# Patient Record
Sex: FEMALE | Race: WHITE | NOT HISPANIC OR LATINO | Employment: OTHER | URBAN - NONMETROPOLITAN AREA
[De-identification: names, ages, dates, MRNs, and addresses within clinical notes are randomized per-mention and may not be internally consistent; named-entity substitution may affect disease eponyms.]

---

## 2020-11-24 ENCOUNTER — CONSULT (OUTPATIENT)
Dept: CARDIOLOGY CLINIC | Facility: CLINIC | Age: 65
End: 2020-11-24
Payer: COMMERCIAL

## 2020-11-24 VITALS
BODY MASS INDEX: 33.57 KG/M2 | SYSTOLIC BLOOD PRESSURE: 158 MMHG | OXYGEN SATURATION: 97 % | DIASTOLIC BLOOD PRESSURE: 68 MMHG | TEMPERATURE: 96.7 F | HEIGHT: 60 IN | HEART RATE: 71 BPM | WEIGHT: 171 LBS

## 2020-11-24 DIAGNOSIS — I25.10 CORONARY ARTERY CALCIFICATION SEEN ON CT SCAN: ICD-10-CM

## 2020-11-24 DIAGNOSIS — Q24.8 LEFT VENTRICULAR OUTFLOW OBSTRUCTION: ICD-10-CM

## 2020-11-24 DIAGNOSIS — E78.2 MIXED HYPERLIPIDEMIA: ICD-10-CM

## 2020-11-24 DIAGNOSIS — R94.39 ABNORMAL STRESS ECHOCARDIOGRAM: Primary | ICD-10-CM

## 2020-11-24 DIAGNOSIS — I10 ESSENTIAL HYPERTENSION: ICD-10-CM

## 2020-11-24 DIAGNOSIS — I51.7 MODERATE CONCENTRIC LEFT VENTRICULAR HYPERTROPHY: ICD-10-CM

## 2020-11-24 PROCEDURE — 99245 OFF/OP CONSLTJ NEW/EST HI 55: CPT | Performed by: INTERNAL MEDICINE

## 2020-11-24 RX ORDER — LOSARTAN POTASSIUM 100 MG/1
100 TABLET ORAL DAILY
COMMUNITY
Start: 2020-11-19 | End: 2021-10-21 | Stop reason: SDUPTHER

## 2020-11-24 RX ORDER — LEVOTHYROXINE SODIUM 0.07 MG/1
TABLET ORAL
COMMUNITY
Start: 2020-07-06

## 2020-11-24 RX ORDER — AMLODIPINE BESYLATE 5 MG/1
TABLET ORAL
COMMUNITY
Start: 2020-09-09 | End: 2020-11-24 | Stop reason: DRUGHIGH

## 2020-11-24 RX ORDER — INSULIN GLARGINE 100 [IU]/ML
INJECTION, SOLUTION SUBCUTANEOUS
COMMUNITY
End: 2021-10-21 | Stop reason: SDUPTHER

## 2020-11-24 RX ORDER — INSULIN ASPART 100 [IU]/ML
INJECTION, SOLUTION INTRAVENOUS; SUBCUTANEOUS
COMMUNITY
Start: 2020-08-27

## 2020-11-24 RX ORDER — OMEPRAZOLE 40 MG/1
40 CAPSULE, DELAYED RELEASE ORAL DAILY
COMMUNITY
End: 2021-10-21 | Stop reason: ALTCHOICE

## 2020-11-24 RX ORDER — SIMVASTATIN 40 MG
40 TABLET ORAL
COMMUNITY
End: 2021-04-20 | Stop reason: ALTCHOICE

## 2020-11-24 RX ORDER — AMLODIPINE BESYLATE 5 MG/1
5 TABLET ORAL DAILY
Qty: 30 TABLET | Refills: 11 | Status: SHIPPED | OUTPATIENT
Start: 2020-11-24 | End: 2021-10-21 | Stop reason: SDUPTHER

## 2020-11-24 RX ORDER — FLUTICASONE PROPIONATE 50 MCG
1 SPRAY, SUSPENSION (ML) NASAL DAILY
COMMUNITY

## 2020-11-29 PROBLEM — R94.39 ABNORMAL STRESS ECHOCARDIOGRAM: Status: ACTIVE | Noted: 2020-11-29

## 2020-11-29 PROBLEM — I25.10 CORONARY ARTERY CALCIFICATION SEEN ON CT SCAN: Status: ACTIVE | Noted: 2020-11-29

## 2020-12-01 ENCOUNTER — HOSPITAL ENCOUNTER (OUTPATIENT)
Dept: NON INVASIVE DIAGNOSTICS | Facility: HOSPITAL | Age: 65
Discharge: HOME | End: 2020-12-01
Attending: INTERNAL MEDICINE
Payer: COMMERCIAL

## 2020-12-01 DIAGNOSIS — Q24.8 LEFT VENTRICULAR OUTFLOW OBSTRUCTION: ICD-10-CM

## 2020-12-01 DIAGNOSIS — I10 ESSENTIAL HYPERTENSION: ICD-10-CM

## 2020-12-01 DIAGNOSIS — I51.7 MODERATE CONCENTRIC LEFT VENTRICULAR HYPERTROPHY: ICD-10-CM

## 2020-12-01 PROCEDURE — 93306 TTE W/DOPPLER COMPLETE: CPT

## 2020-12-01 PROCEDURE — 93306 TTE W/DOPPLER COMPLETE: CPT | Performed by: INTERNAL MEDICINE

## 2020-12-04 ENCOUNTER — TELEPHONE (OUTPATIENT)
Dept: CARDIOLOGY CLINIC | Facility: CLINIC | Age: 65
End: 2020-12-04

## 2020-12-10 ENCOUNTER — TELEPHONE (OUTPATIENT)
Dept: OTHER | Facility: OTHER | Age: 65
End: 2020-12-10

## 2020-12-11 ENCOUNTER — CLINICAL SUPPORT (OUTPATIENT)
Dept: CARDIOLOGY CLINIC | Facility: CLINIC | Age: 65
End: 2020-12-11
Payer: COMMERCIAL

## 2020-12-11 VITALS
OXYGEN SATURATION: 99 % | HEART RATE: 74 BPM | HEIGHT: 60 IN | DIASTOLIC BLOOD PRESSURE: 74 MMHG | TEMPERATURE: 97.3 F | BODY MASS INDEX: 33.96 KG/M2 | SYSTOLIC BLOOD PRESSURE: 144 MMHG | WEIGHT: 173 LBS

## 2020-12-11 DIAGNOSIS — I10 HYPERTENSION, UNSPECIFIED TYPE: Primary | ICD-10-CM

## 2020-12-11 PROCEDURE — 99211 OFF/OP EST MAY X REQ PHY/QHP: CPT

## 2021-01-13 ENCOUNTER — OFFICE VISIT (OUTPATIENT)
Dept: CARDIOLOGY CLINIC | Facility: CLINIC | Age: 66
End: 2021-01-13
Payer: COMMERCIAL

## 2021-01-13 VITALS
WEIGHT: 173 LBS | OXYGEN SATURATION: 97 % | BODY MASS INDEX: 33.96 KG/M2 | HEIGHT: 60 IN | SYSTOLIC BLOOD PRESSURE: 110 MMHG | DIASTOLIC BLOOD PRESSURE: 64 MMHG | HEART RATE: 67 BPM

## 2021-01-13 DIAGNOSIS — R94.39 ABNORMAL STRESS ECHOCARDIOGRAM: Primary | ICD-10-CM

## 2021-01-13 DIAGNOSIS — E78.2 MIXED HYPERLIPIDEMIA: ICD-10-CM

## 2021-01-13 DIAGNOSIS — E11.9 TYPE 2 DIABETES MELLITUS WITHOUT COMPLICATION, WITH LONG-TERM CURRENT USE OF INSULIN (HCC): ICD-10-CM

## 2021-01-13 DIAGNOSIS — I25.10 CORONARY ARTERY CALCIFICATION SEEN ON CT SCAN: ICD-10-CM

## 2021-01-13 DIAGNOSIS — I10 ESSENTIAL HYPERTENSION: ICD-10-CM

## 2021-01-13 DIAGNOSIS — Z79.4 TYPE 2 DIABETES MELLITUS WITHOUT COMPLICATION, WITH LONG-TERM CURRENT USE OF INSULIN (HCC): ICD-10-CM

## 2021-01-13 DIAGNOSIS — I51.7 MODERATE CONCENTRIC LEFT VENTRICULAR HYPERTROPHY: ICD-10-CM

## 2021-01-13 DIAGNOSIS — Q24.8 LEFT VENTRICULAR OUTFLOW OBSTRUCTION: ICD-10-CM

## 2021-01-13 PROCEDURE — 99214 OFFICE O/P EST MOD 30 MIN: CPT | Performed by: INTERNAL MEDICINE

## 2021-01-13 NOTE — PATIENT INSTRUCTIONS
Continue current medications  Call me with any change in symptoms  Will discuss repeat echocardiogram at follow up to reevaluate wall thickness of the heart

## 2021-01-13 NOTE — PROGRESS NOTES
Cardiology Office Visit    Damian Buchanan  [id number]  1955    [facility]  [address]  [facility] [id number] [phone]      Dear Madeline Pimentel DO,    I had the pleasure of seeing your patient at our [address] [facility] [place] office today 1/13/2021  As you know she is a pleasant female with a medical history as described below  Reason for office visit:  Follow up hypertension, hyperlipidemia, abnormal stress echocardiogram and abnormal CT findings  1  Abnormal stress echocardiogram  Assessment & Plan:  Patient initially presented to her PCP with complaints of right-sided chest discomfort  CT scan of the chest was ordered which showed severe coronary atherosclerotic disease  Patient was sent for a stress echocardiogram which was completed 09/22/2020 and showed no definitive evidence of ischemia at submaximal heart rate (84% MPHR vs 85% expected)  Stress echocardiogram was notable for moderate concentric left ventricular hypertrophy as well as mild mid cavitary obstruction with Valsalva  Patient did and continues to  note dyspnea on exertion (reports stable and actually improved since last visit) but denies any chest pain  Echocardiogram 12/1/2020 showed mild cLVH and a mid cavitary MG of 4 mmHg  Patients blood pressure was  not well controlled and likely LVH was due to this however may consider cardiac MRI to assess for hypertrophic cardiomyopathy pending review of repeat echocardiogram which we will plan for next year as BP is now well controlled  See further discussion below  2  Moderate concentric left ventricular hypertrophy  Assessment & Plan: Moderate concentric left ventricular hypertrophy noted on echocardiogram pre stress echocardiogram 09/22/2020  IVSd 1 4 cm  LVPWd 1 5 cm  Repeat echocardiogram on 12/1/2020 showed only mild cLVH with IVSd 1 1 and LVPWd 1 1    Blood pressure is well controlled on exam    Will plan repeat echocardiogram 12/2021 unless symptoms warrant earlier testing  3  Left ventricular outflow obstruction  Assessment & Plan:  Stress echocardiogram 09/22/2020 suggested mild mid cavitary obstruction with Valsalva  No definitive LVOT obstruction was noted  Please see full discussion under abnormal stress echocardiogram and moderate concentric left ventricular hypertrophy  Echocardiogram 12/2020 showed mild mid cavitary obstruction with a gradient of 4 mmHg  Will plan repeat echocardiogram 12/2021        4  Coronary artery calcification seen on CT scan  Assessment & Plan:  CT scan 09/04/2020 suggested severe coronary atherosclerotic disease  Non ischemic stress echocardiogram at 84% MPHR  I would recommend aggressive risk factor modification  LDL goal < 70    BP ideally < 130/80  Would have a low threshold for coronary CT angiogram vs cardiac catheterization if she has progressive shortness of breath/dyspnea on exertion or chest pain concerning for underlying coronary artery disease  Can consider coronary calcium scoring as well for further risk stratification  Would add aspirin however she has an aspirin allergy (itching)  5  Essential hypertension  Assessment & Plan:  Blood pressure is well controlled on exam today  Blood pressure was elevated on my personal recheck at 158/68 at last office visit  I had recommended increasing amlodipine to 5 mg daily at last office visit and blood pressures have been much better controlled  Continue losartan 100 mg daily  Ideally would like to see systolic blood pressure <647 given left ventricular hypertrophy on echocardiogram and mid cavitary obstruction  May benefit from change from amlodipine to verapamil in the future if mid cavitary gradients increase  6  Mixed hyperlipidemia  Assessment & Plan:  Lipid panel 12/24/2020: C 149  T 87  H 47  L 85  Lipid panel 9/3/2019: C 146  T 112  H 40  L 84     Goal LDL < 70 given findings suggestive of severe atherosclerotic disease on CT scan and DM II  Will  transition to rosuvastatin 40 mg daily if LDL remains > 70 at follow up  7  Type 2 diabetes mellitus without complication, with long-term current use of insulin ([facility] [address] )  Assessment & Plan:  HgA1c 7 6% on labs reviewed from 12/24/2020  Discussed the importance of glucose control from a cardiovascular standpoint  Needs close follow up with PCP  Goal LDL < 70  HPI     11/24/2020: Patient presents to the office for evaluation of abnormal stress echocardiogram and coronary artery disease  Patient underwent a CT scan for right-sided chest pain in her right rib area  CT scan dated 09/04/2020 showed severe coronary atherosclerotic disease  Patient subsequently underwent stress echocardiogram 09/22/2020 which did not show any inducible ischemia but was notable for a hypertensive blood pressure response to exercise  Of note the patient did not take her blood pressure medications the day of the test   Stress echocardiogram did suggest hyperdynamic LV systolic function with an EF greater than 70% as well as moderate concentric left ventricular hypertrophy and mild mid cavitary obstruction with Valsalva  Patient does tell me that she has had high blood pressure for at least 10 years  Amlodipine has been added over the last year or so  Blood pressures at home remain elevated in the 160s  She does note some dyspnea on exertion when she goes up and down approximately 3 flights of stairs  She denies any chest pain  She does have some intermittent right shoulder discomfort  She does describe occasionally hearing a hissing in her left ear  The patient does note trouble sleeping and decreased energy/fatigue  She also reports intermittent heartburn as well as alternating constipation/diarrhea  She does get up frequently at night to urinate  She also reports intermittent headaches as well as anxiety    She does have arthritis  She reports intermittent back pain  1/13/2021:  Patient presents to the office today for follow up  She denies any chest pain or shortness of breath  Dyspnea on exertion seems better from prior office visit  Echocardiogram 12/1/2020 reviewed  Labs reviewed  HgA1c 7 6%  LDL 85  She reports blood pressure is better since increase in amlodipine  She has been diagnosed with macular degeneration  She cannot take aspirin due to pruritis           Patient Active Problem List   Diagnosis    Hypertension    Mixed hyperlipidemia    Type 2 diabetes mellitus without complication, with long-term current use of insulin ([facility] )    Hypothyroid    Moderate concentric left ventricular hypertrophy    Left ventricular outflow obstruction    Coronary artery calcification seen on CT scan    Abnormal stress echocardiogram     Past Medical History:   Diagnosis Date    Arthritis     Basal cell carcinoma     Diabetes ([facility] )     Hypercholesteremia     Hypertension     Hypothyroid     Macular degeneration     Melanoma ([place] [address] )     Rosacea     Vertigo      Social History     Socioeconomic History    Marital status:      Spouse name: Not on file    Number of children: 2    Years of education: Not on file    Highest education level: Not on file   Occupational History    Not on file   Tobacco Use    Smoking status: Never Smoker    Smokeless tobacco: Never Used   Vaping Use    Vaping Use: Never used   Substance and Sexual Activity    Alcohol use: Never    Drug use: Never    Sexual activity: Not on file     Comment: Defer   Other Topics Concern    Not on file   Social History Narrative    Not on file     Social Determinants of Health     Financial Resource Strain: Not on file   Food Insecurity: Not on file   Transportation Needs: Not on file   Physical Activity: Not on file   Stress: Not on file   Social Connections: Not on file   Intimate Partner Violence: Not on file   Housing Stability: Not on file      Family History   Problem Relation Age of Onset    Diabetes Mother     Hyperlipidemia Mother     Hypertension Mother     Heart failure Mother     Heart attack Father         Unsure    Hypertension Father     Hypertension Brother     Diabetes Brother     Drug abuse Brother      Past Surgical History:   Procedure Laterality Date    CHOLECYSTECTOMY      HYSTERECTOMY      INCONTINENCE SURGERY      SKIN CANCER EXCISION      melanoma right arm  basal cell and squamous cell   TONSILLECTOMY       * Current medications reviewed  Allergies   Allergen Reactions    Lisinopril Cough    Naproxen Itching    Salicylates Itching         Cardiac Testing:     Echocardiogram 12/1/2020:   EF 70-75%  Mildly increased wall thickness  Dynamic obstruction during Valsalva in the mid cavity with a mean gradient of 4 mmHg  Mild diastolic dysfunction  Mildly dilated left atrium  Trace MR  Trace TR  Lipid panel 12/24/2020: C 149  T 87  H 47  L 85  Stress echocardiogram 09/22/2020:  Kvng Pion  6:00  6 METs  84% MPHR  PVC's at rest    Test terminated due to hypertensive BP response to exercise with peak /92  Echocardiogram notable for EF >70% (hyperdynamic) with moderately increased wall thickness and mild mid cavitary obstruction with Valsalva  IVSd 1 4 cm  LVPWd 1 5 cm  Mild left atrial enlargement was also noted  Mild diastolic dysfunction  Aortic sclerosis without stenosis  CT chest 09/04/2020:  No acute cardiopulmonary process  There is severe coronary atherosclerotic disease  No focal abnormality involving the chest wall was identified (preliminary report)  Lipid panel 9/3/2019: C 146  T 112  H 40  L 84  EKG 07/31/2019:  Normal sinus rhythm  Normal EKG  Leftward axis  Review of Systems:    Review of Systems   Constitutional: Positive for fatigue  Negative for activity change and appetite change     HENT: Negative for congestion, hearing loss, tinnitus and trouble swallowing  Eyes: Negative for visual disturbance  Respiratory: Positive for shortness of breath (dyspnea on exertion (this seems improved from prior visit))  Negative for cough, chest tightness and wheezing  Cardiovascular: Negative for chest pain, palpitations and leg swelling  Gastrointestinal: Positive for constipation and diarrhea  Negative for abdominal distention, abdominal pain, nausea and vomiting  Heartburn   Genitourinary: Negative for difficulty urinating  Nocturia   Musculoskeletal: Positive for arthralgias and back pain  Skin: Negative for rash  Neurological: Positive for headaches  Negative for dizziness, syncope and light-headedness  Hematological: Does not bruise/bleed easily  Psychiatric/Behavioral: Positive for sleep disturbance  Negative for confusion  The patient is nervous/anxious  All other systems reviewed and are negative  Vitals:    01/13/21 1542   BP: 110/64   BP Location: Left arm   Patient Position: Sitting   Pulse: 67   SpO2: 97%   Weight: 78 5 kg (173 lb)   Height: 5' (1 524 m)     Vitals:    01/13/21 1542   Weight: 78 5 kg (173 lb)     Height: 5' (152 4 cm)     Physical Exam   Constitutional: She is oriented to person, place, and time  She appears well-developed and well-nourished  HENT:   Head: Normocephalic and atraumatic  Eyes: Pupils are equal, round, and reactive to light  Conjunctivae are normal    Neck: Normal range of motion  No JVD present  Cardiovascular: Normal rate and regular rhythm  Exam reveals no gallop and no friction rub  Murmur heard  Pulmonary/Chest: Effort normal and breath sounds normal    Abdominal: Soft  Bowel sounds are normal    Musculoskeletal:         General: No edema  Neurological: She is alert and oriented to person, place, and time  Skin: Skin is warm and dry  Psychiatric: She has a normal mood and affect  Her behavior is normal    Vitals reviewed

## 2021-03-10 DIAGNOSIS — Z23 ENCOUNTER FOR IMMUNIZATION: ICD-10-CM

## 2021-04-20 ENCOUNTER — OFFICE VISIT (OUTPATIENT)
Dept: CARDIOLOGY CLINIC | Facility: CLINIC | Age: 66
End: 2021-04-20
Payer: COMMERCIAL

## 2021-04-20 VITALS
HEART RATE: 74 BPM | HEIGHT: 60 IN | SYSTOLIC BLOOD PRESSURE: 132 MMHG | DIASTOLIC BLOOD PRESSURE: 68 MMHG | BODY MASS INDEX: 35.14 KG/M2 | WEIGHT: 179 LBS | OXYGEN SATURATION: 95 %

## 2021-04-20 DIAGNOSIS — E78.2 MIXED HYPERLIPIDEMIA: ICD-10-CM

## 2021-04-20 DIAGNOSIS — R94.39 ABNORMAL STRESS ECHOCARDIOGRAM: Primary | ICD-10-CM

## 2021-04-20 DIAGNOSIS — Q24.8 LEFT VENTRICULAR OUTFLOW OBSTRUCTION: ICD-10-CM

## 2021-04-20 DIAGNOSIS — I25.10 CORONARY ARTERY CALCIFICATION SEEN ON CT SCAN: ICD-10-CM

## 2021-04-20 DIAGNOSIS — I51.7 MODERATE CONCENTRIC LEFT VENTRICULAR HYPERTROPHY: ICD-10-CM

## 2021-04-20 DIAGNOSIS — I10 ESSENTIAL HYPERTENSION: ICD-10-CM

## 2021-04-20 PROCEDURE — 99214 OFFICE O/P EST MOD 30 MIN: CPT | Performed by: INTERNAL MEDICINE

## 2021-04-20 RX ORDER — ROSUVASTATIN CALCIUM 20 MG/1
20 TABLET, COATED ORAL DAILY
Qty: 30 TABLET | Refills: 11 | Status: SHIPPED | OUTPATIENT
Start: 2021-04-20 | End: 2021-10-21 | Stop reason: SDUPTHER

## 2021-04-20 NOTE — PATIENT INSTRUCTIONS
I would continue to monitor blood pressure intermittently at home  Call me if you have any change in symptoms  Will plan repeat echocardiogram 12/2021  Can consider calcium scoring in the future  Can also consider cardiac MRI as discussed  I would recommend transition off of simvastatin and start rosuvastatin  This is a stronger statin  Ideally LDL goal should be less than 70  Repeat labs in 6-8 weeks

## 2021-10-21 ENCOUNTER — OFFICE VISIT (OUTPATIENT)
Dept: CARDIOLOGY CLINIC | Facility: CLINIC | Age: 66
End: 2021-10-21
Payer: MEDICARE

## 2021-10-21 VITALS
HEIGHT: 61 IN | WEIGHT: 177.4 LBS | HEART RATE: 70 BPM | BODY MASS INDEX: 33.49 KG/M2 | SYSTOLIC BLOOD PRESSURE: 128 MMHG | DIASTOLIC BLOOD PRESSURE: 62 MMHG | TEMPERATURE: 98.3 F

## 2021-10-21 DIAGNOSIS — Z79.4 TYPE 2 DIABETES MELLITUS WITH STABLE PROLIFERATIVE RETINOPATHY OF BOTH EYES, WITH LONG-TERM CURRENT USE OF INSULIN (HCC): ICD-10-CM

## 2021-10-21 DIAGNOSIS — E11.3553 TYPE 2 DIABETES MELLITUS WITH STABLE PROLIFERATIVE RETINOPATHY OF BOTH EYES, WITH LONG-TERM CURRENT USE OF INSULIN (HCC): ICD-10-CM

## 2021-10-21 DIAGNOSIS — I51.7 MODERATE CONCENTRIC LEFT VENTRICULAR HYPERTROPHY: ICD-10-CM

## 2021-10-21 DIAGNOSIS — I25.10 CORONARY ARTERY CALCIFICATION SEEN ON CT SCAN: ICD-10-CM

## 2021-10-21 DIAGNOSIS — E03.9 HYPOTHYROIDISM, UNSPECIFIED TYPE: ICD-10-CM

## 2021-10-21 DIAGNOSIS — R94.39 ABNORMAL STRESS ECHOCARDIOGRAM: ICD-10-CM

## 2021-10-21 DIAGNOSIS — Q24.8 LEFT VENTRICULAR OUTFLOW OBSTRUCTION: ICD-10-CM

## 2021-10-21 DIAGNOSIS — E78.2 MIXED HYPERLIPIDEMIA: ICD-10-CM

## 2021-10-21 DIAGNOSIS — I10 PRIMARY HYPERTENSION: Primary | ICD-10-CM

## 2021-10-21 PROCEDURE — 99214 OFFICE O/P EST MOD 30 MIN: CPT | Performed by: NURSE PRACTITIONER

## 2021-10-21 RX ORDER — FAMOTIDINE 20 MG/1
20 TABLET, FILM COATED ORAL 2 TIMES DAILY
COMMUNITY

## 2021-10-21 RX ORDER — PEN NEEDLE, DIABETIC 32GX 5/32"
NEEDLE, DISPOSABLE MISCELLANEOUS
COMMUNITY
Start: 2021-08-26

## 2021-10-21 RX ORDER — BLOOD SUGAR DIAGNOSTIC
STRIP MISCELLANEOUS
COMMUNITY
Start: 2021-06-29

## 2021-10-21 RX ORDER — INSULIN ASPART 100 [IU]/ML
INJECTION, SOLUTION INTRAVENOUS; SUBCUTANEOUS
COMMUNITY
Start: 2021-10-08 | End: 2021-10-21 | Stop reason: SDUPTHER

## 2021-10-21 RX ORDER — AMLODIPINE BESYLATE 5 MG/1
5 TABLET ORAL DAILY
Qty: 90 TABLET | Refills: 3 | Status: SHIPPED | OUTPATIENT
Start: 2021-10-21

## 2021-10-21 RX ORDER — INSULIN GLARGINE 100 [IU]/ML
40 INJECTION, SOLUTION SUBCUTANEOUS DAILY
COMMUNITY
Start: 2021-10-02

## 2021-10-21 RX ORDER — ROSUVASTATIN CALCIUM 20 MG/1
20 TABLET, COATED ORAL DAILY
Qty: 90 TABLET | Refills: 3 | Status: SHIPPED | OUTPATIENT
Start: 2021-10-21 | End: 2022-04-22

## 2021-10-21 RX ORDER — LOSARTAN POTASSIUM 100 MG/1
100 TABLET ORAL DAILY
Qty: 90 TABLET | Refills: 3 | Status: SHIPPED | OUTPATIENT
Start: 2021-10-21

## 2022-01-13 ENCOUNTER — HOSPITAL ENCOUNTER (OUTPATIENT)
Dept: NON INVASIVE DIAGNOSTICS | Facility: HOSPITAL | Age: 67
Discharge: HOME | End: 2022-01-13
Payer: MEDICARE

## 2022-01-13 ENCOUNTER — TELEPHONE (OUTPATIENT)
Dept: CARDIOLOGY CLINIC | Facility: CLINIC | Age: 67
End: 2022-01-13

## 2022-01-13 VITALS
HEIGHT: 61 IN | HEART RATE: 70 BPM | SYSTOLIC BLOOD PRESSURE: 128 MMHG | WEIGHT: 177 LBS | DIASTOLIC BLOOD PRESSURE: 62 MMHG | BODY MASS INDEX: 33.42 KG/M2

## 2022-01-13 DIAGNOSIS — I51.7 MODERATE CONCENTRIC LEFT VENTRICULAR HYPERTROPHY: ICD-10-CM

## 2022-01-13 DIAGNOSIS — I10 PRIMARY HYPERTENSION: ICD-10-CM

## 2022-01-13 DIAGNOSIS — Q24.8 LEFT VENTRICULAR OUTFLOW OBSTRUCTION: ICD-10-CM

## 2022-01-13 LAB
AORTIC VALVE ANNULUS: 3.1 CM
AORTIC VALVE MEAN VELOCITY: 14.4 M/S
APICAL FOUR CHAMBER EJECTION FRACTION: 85 %
AV AREA BY CONTINUOUS VTI: 1.7 CM2
AV AREA PEAK VELOCITY: 1.4 CM2
AV LVOT MEAN GRADIENT: 4 MMHG
AV LVOT PEAK GRADIENT: 6 MMHG
AV MEAN GRADIENT: 9 MMHG
AV PEAK GRADIENT: 16 MMHG
AV VELOCITY RATIO: 0.62
DOP CALC AO PEAK VEL: 2 M/S
DOP CALC AO VTI: 40.8 CM
DOP CALC LVOT DIAMETER: 1.7 CM
DOP CALC LVOT PEAK VEL VTI: 30.53 CM
DOP CALC LVOT PEAK VEL: 1.23 M/S
DOP CALC LVOT STROKE INDEX: 40.8 ML/M2
DOP CALC LVOT STROKE VOLUME: 73
E WAVE DECELERATION TIME: 232 MS
E/A RATIO: 0.57
FRACTIONAL SHORTENING: 51 (ref 28–44)
INTERVENTRICULAR SEPTUM IN DIASTOLE (PARASTERNAL SHORT AXIS VIEW): 1 CM
LAAS-AP4: 17.2 CM2
LEFT ATRIUM SIZE: 3.7 CM
LEFT INTERNAL DIMENSION IN SYSTOLE: 2.2 CM (ref 2.1–4)
LEFT VENTRICLE DIASTOLIC VOLUME (MOD BIPLANE): 43 ML
LEFT VENTRICLE SYSTOLIC VOLUME (MOD BIPLANE): 8 ML
LEFT VENTRICULAR INTERNAL DIMENSION IN DIASTOLE: 4.5 CM (ref 4.34–6.46)
LEFT VENTRICULAR POSTERIOR WALL IN END DIASTOLE: 0.9 CM
LEFT VENTRICULAR STROKE VOLUME: 77 ML
LV EF: 81 %
MV E'TISSUE VEL-LAT: 9 CM/S
MV E'TISSUE VEL-SEP: 6 CM/S
MV PEAK A VEL: 0.72 M/S
MV PEAK E VEL: 41 CM/S
RIGHT ATRIAL 2D VOLUME: 14 ML
RIGHT ATRIUM AREA SYSTOLE A4C: 8.3 CM2
SL CV PED ECHO LEFT VENTRICLE DIASTOLIC VOLUME (MOD BIPLANE) 2D: 93 ML
SL CV PED ECHO LEFT VENTRICLE SYSTOLIC VOLUME (MOD BIPLANE) 2D: 16 ML
TRICUSPID VALVE PEAK REGURGITATION VELOCITY: 2.22 M/S
TV PEAK GRADIENT: 20 MMHG
Z-SCORE OF LEFT VENTRICULAR DIMENSION IN END SYSTOLE: -1.62

## 2022-01-13 PROCEDURE — 93306 TTE W/DOPPLER COMPLETE: CPT

## 2022-01-13 NOTE — TELEPHONE ENCOUNTER
Spoke with pt regarding her updated echo results, which shows top-normal LV wall thickness with no evidence of a significant dynamic LV outflow tract gradient  She is feeling well at this time with no concerns  Will follow up in May as scheduled or sooner if any concerns    She verbalized understanding

## 2022-04-10 NOTE — ASSESSMENT & PLAN NOTE
Blood pressure is well controlled on exam today  Blood pressure was elevated on my personal recheck at 158/68 at last office visit  I had recommended increasing amlodipine to 5 mg daily at last office visit and blood pressures have been much better controlled  Continue losartan 100 mg daily  Ideally would like to see systolic blood pressure <436 given left ventricular hypertrophy on echocardiogram and mid cavitary obstruction  May benefit from change from amlodipine to verapamil in the future if mid cavitary gradients increase

## 2022-04-10 NOTE — ASSESSMENT & PLAN NOTE
Patient initially presented to her PCP with complaints of right-sided chest discomfort  CT scan of the chest was ordered which showed severe coronary atherosclerotic disease  Patient was sent for a stress echocardiogram which was completed 09/22/2020 and showed no definitive evidence of ischemia at submaximal heart rate (84% MPHR vs 85% expected)  Stress echocardiogram was notable for moderate concentric left ventricular hypertrophy as well as mild mid cavitary obstruction with Valsalva  Patient did and continues to  note dyspnea on exertion (reports stable and actually improved since last visit) but denies any chest pain  Echocardiogram 12/1/2020 showed mild cLVH and a mid cavitary MG of 4 mmHg  Patients blood pressure was  not well controlled and likely LVH was due to this however may consider cardiac MRI to assess for hypertrophic cardiomyopathy pending review of repeat echocardiogram which we will plan for next year as BP is now well controlled  See further discussion below

## 2022-04-10 NOTE — ASSESSMENT & PLAN NOTE
Lipid panel 12/24/2020: C 149  T 87  H 47  L 85  Lipid panel 9/3/2019: C 146  T 112  H 40  L 84  Goal LDL < 70 given findings suggestive of severe atherosclerotic disease on CT scan and DM II  Will  transition to rosuvastatin 40 mg daily if LDL remains > 70 at follow up

## 2022-04-10 NOTE — ASSESSMENT & PLAN NOTE
CT scan 09/04/2020 suggested severe coronary atherosclerotic disease  Non ischemic stress echocardiogram at 84% MPHR  I would recommend aggressive risk factor modification  LDL goal < 70    BP ideally < 130/80  Would have a low threshold for coronary CT angiogram vs cardiac catheterization if she has progressive shortness of breath/dyspnea on exertion or chest pain concerning for underlying coronary artery disease  Can consider coronary calcium scoring as well for further risk stratification  Would add aspirin however she has an aspirin allergy (itching)

## 2022-04-10 NOTE — ASSESSMENT & PLAN NOTE
Stress echocardiogram 09/22/2020 suggested mild mid cavitary obstruction with Valsalva  No definitive LVOT obstruction was noted  Please see full discussion under abnormal stress echocardiogram and moderate concentric left ventricular hypertrophy  Echocardiogram 12/2020 showed mild mid cavitary obstruction with a gradient of 4 mmHg  Will plan repeat echocardiogram 12/2021

## 2022-04-10 NOTE — ASSESSMENT & PLAN NOTE
Moderate concentric left ventricular hypertrophy noted on echocardiogram pre stress echocardiogram 09/22/2020  IVSd 1 4 cm  LVPWd 1 5 cm  Repeat echocardiogram on 12/1/2020 showed only mild cLVH with IVSd 1 1 and LVPWd 1 1  Blood pressure is well controlled on exam    Will plan repeat echocardiogram 12/2021 unless symptoms warrant earlier testing

## 2022-04-10 NOTE — ASSESSMENT & PLAN NOTE
HgA1c 7 6% on labs reviewed from 12/24/2020  Discussed the importance of glucose control from a cardiovascular standpoint  Needs close follow up with PCP  Goal LDL < 70

## 2022-04-20 NOTE — PROGRESS NOTES
Cardiology Office Visit    Renetta Acosta  [id number]  1955    [facility] [address]  [place] [address] [place] [address]  [phone]      Dear Laya Bell DO,    I had the pleasure of seeing your patient at our [address] [facility] office today 4/20/2021  As you know she is a pleasant female with a medical history as described below  Reason for office visit:  Follow up hypertension, hyperlipidemia, abnormal stress echocardiogram and abnormal CT findings  1  Abnormal stress echocardiogram    2  Moderate concentric left ventricular hypertrophy    3  Left ventricular outflow obstruction    4  Coronary artery calcification seen on CT scan    5  Essential hypertension    6  Mixed hyperlipidemia  -     Lipid panel; Future; Expected date: 06/20/2021         I would continue to monitor blood pressure intermittently at home  Call me if you have any change in symptoms  Will plan repeat echocardiogram 12/2021  Can consider calcium scoring in the future  Can also consider cardiac MRI as discussed  I would recommend transition off of simvastatin and start rosuvastatin  This is a stronger statin  Ideally LDL goal should be less than 70  Repeat labs in 6-8 weeks  HPI     11/24/2020: Patient presents to the office for evaluation of abnormal stress echocardiogram and coronary artery disease  Patient underwent a CT scan for right-sided chest pain in her right rib area  CT scan dated 09/04/2020 showed severe coronary atherosclerotic disease  Patient subsequently underwent stress echocardiogram 09/22/2020 which did not show any inducible ischemia but was notable for a hypertensive blood pressure response to exercise    Of note the patient did not take her blood pressure medications the day of the test   Stress echocardiogram did suggest hyperdynamic LV systolic function with an EF greater than 70% as well as moderate concentric left ventricular hypertrophy and mild mid cavitary obstruction with Valsalva  Patient does tell me that she has had high blood pressure for at least 10 years  Amlodipine has been added over the last year or so  Blood pressures at home remain elevated in the 160s  She does note some dyspnea on exertion when she goes up and down approximately 3 flights of stairs  She denies any chest pain  She does have some intermittent right shoulder discomfort  She does describe occasionally hearing a hissing in her left ear  The patient does note trouble sleeping and decreased energy/fatigue  She also reports intermittent heartburn as well as alternating constipation/diarrhea  She does get up frequently at night to urinate  She also reports intermittent headaches as well as anxiety  She does have arthritis  She reports intermittent back pain  1/13/2021:  Patient presents to the office today for follow up  She denies any chest pain or shortness of breath  Dyspnea on exertion seems better from prior office visit  Echocardiogram 12/1/2020 reviewed  Labs reviewed  HgA1c 7 6%  LDL 85  She reports blood pressure is better since increase in amlodipine  She has been diagnosed with macular degeneration  She cannot take aspirin due to pruritis  4/20/2021:  Patient presents to the office today for follow-up          Patient Active Problem List   Diagnosis    Hypertension    Mixed hyperlipidemia    Type 2 diabetes mellitus without complication, with long-term current use of insulin ([address] )    Hypothyroid    Moderate concentric left ventricular hypertrophy    Left ventricular outflow obstruction    Coronary artery calcification seen on CT scan    Abnormal stress echocardiogram     Past Medical History:   Diagnosis Date    Arthritis     Basal cell carcinoma     Diabetes ([address] )     Hypercholesteremia     Hypertension     Hypothyroid     Macular degeneration     Melanoma (HCC)     Rosacea     Vertigo Social History     Socioeconomic History    Marital status:      Spouse name: Not on file    Number of children: 2    Years of education: Not on file    Highest education level: Not on file   Occupational History    Not on file   Tobacco Use    Smoking status: Never Smoker    Smokeless tobacco: Never Used   Vaping Use    Vaping Use: Never used   Substance and Sexual Activity    Alcohol use: Never    Drug use: Never    Sexual activity: Not on file     Comment: Defer   Other Topics Concern    Not on file   Social History Narrative    Not on file     Social Determinants of Health     Financial Resource Strain: Not on file   Food Insecurity: Not on file   Transportation Needs: Not on file   Physical Activity: Not on file   Stress: Not on file   Social Connections: Not on file   Intimate Partner Violence: Not on file   Housing Stability: Not on file      Family History   Problem Relation Age of Onset    Diabetes Mother     Hyperlipidemia Mother     Hypertension Mother     Heart failure Mother     Heart attack Father         Unsure    Hypertension Father     Hypertension Brother     Diabetes Brother     Drug abuse Brother      Past Surgical History:   Procedure Laterality Date    CHOLECYSTECTOMY      HYSTERECTOMY      INCONTINENCE SURGERY      SKIN CANCER EXCISION      melanoma right arm  basal cell and squamous cell   TONSILLECTOMY       * Current medications reviewed  Allergies   Allergen Reactions    Lisinopril Cough    Naproxen Itching    Salicylates Itching         Cardiac Testing:     Echocardiogram 12/1/2020:   EF 70-75%  Mildly increased wall thickness  Dynamic obstruction during Valsalva in the mid cavity with a mean gradient of 4 mmHg  Mild diastolic dysfunction  Mildly dilated left atrium  Trace MR  Trace TR  Lipid panel 12/24/2020: C 149  T 87  H 47  L 85  Stress echocardiogram 09/22/2020:  Jennifer Abdalla  6:00  6 METs  84% MPHR   PVC's at rest    Test terminated due to hypertensive BP response to exercise with peak /92  Echocardiogram notable for EF >70% (hyperdynamic) with moderately increased wall thickness and mild mid cavitary obstruction with Valsalva  IVSd 1 4 cm  LVPWd 1 5 cm  Mild left atrial enlargement was also noted  Mild diastolic dysfunction  Aortic sclerosis without stenosis  CT chest 09/04/2020:  No acute cardiopulmonary process  There is severe coronary atherosclerotic disease  No focal abnormality involving the chest wall was identified (preliminary report)  Lipid panel 9/3/2019: C 146  T 112  H 40  L 84  EKG 07/31/2019:  Normal sinus rhythm  Normal EKG  Leftward axis  Review of Systems:    Review of Systems   Constitutional: Positive for fatigue  Negative for activity change and appetite change  HENT: Negative for congestion, hearing loss, tinnitus and trouble swallowing  Eyes: Negative for visual disturbance  Respiratory: Positive for shortness of breath (dyspnea on exertion (this seems improved from prior visit))  Negative for cough, chest tightness and wheezing  Cardiovascular: Negative for chest pain, palpitations and leg swelling  Gastrointestinal: Positive for constipation and diarrhea  Negative for abdominal distention, abdominal pain, nausea and vomiting  Heartburn   Genitourinary: Negative for difficulty urinating  Nocturia   Musculoskeletal: Positive for arthralgias and back pain  Skin: Negative for rash  Neurological: Positive for headaches  Negative for dizziness, syncope and light-headedness  Hematological: Does not bruise/bleed easily  Psychiatric/Behavioral: Positive for sleep disturbance  Negative for confusion  The patient is nervous/anxious  All other systems reviewed and are negative          Vitals:    04/20/21 1357 04/20/21 1440   BP: 142/64 132/68   Pulse: 74    SpO2: 95%    Weight: 81 2 kg (179 lb)    Height: 5' (1 524 m)      Vitals:    04/20/21 1357 Weight: 81 2 kg (179 lb)     Height: 5' (152 4 cm)     Physical Exam  Vitals reviewed  Constitutional:       Appearance: She is well-developed  HENT:      Head: Normocephalic and atraumatic  Eyes:      Conjunctiva/sclera: Conjunctivae normal       Pupils: Pupils are equal, round, and reactive to light  Neck:      Vascular: No JVD  Cardiovascular:      Rate and Rhythm: Normal rate and regular rhythm  Heart sounds: Murmur heard  No friction rub  No gallop  Pulmonary:      Effort: Pulmonary effort is normal       Breath sounds: Normal breath sounds  Abdominal:      General: Bowel sounds are normal       Palpations: Abdomen is soft  Musculoskeletal:      Cervical back: Normal range of motion  Skin:     General: Skin is warm and dry  Neurological:      Mental Status: She is alert and oriented to person, place, and time     Psychiatric:         Behavior: Behavior normal

## 2022-04-22 ENCOUNTER — TELEPHONE (OUTPATIENT)
Dept: CARDIOLOGY CLINIC | Facility: CLINIC | Age: 67
End: 2022-04-22

## 2022-04-22 NOTE — TELEPHONE ENCOUNTER
I got a refill request for rosuvastatin 20 mg daily  Keep please call patient and confirm that this is the correct dose as is documented as 40 mg in 1 of the notes but I do think it is actually 20 mg  Thank you

## 2022-06-16 ENCOUNTER — OFFICE VISIT (OUTPATIENT)
Dept: CARDIOLOGY CLINIC | Facility: CLINIC | Age: 67
End: 2022-06-16
Payer: MEDICARE

## 2022-06-16 VITALS
BODY MASS INDEX: 34.59 KG/M2 | SYSTOLIC BLOOD PRESSURE: 130 MMHG | HEIGHT: 61 IN | WEIGHT: 183.2 LBS | DIASTOLIC BLOOD PRESSURE: 60 MMHG | OXYGEN SATURATION: 96 % | HEART RATE: 69 BPM

## 2022-06-16 DIAGNOSIS — Q24.8 LEFT VENTRICULAR OUTFLOW OBSTRUCTION: ICD-10-CM

## 2022-06-16 DIAGNOSIS — E11.9 TYPE 2 DIABETES MELLITUS WITHOUT COMPLICATION, WITH LONG-TERM CURRENT USE OF INSULIN (HCC): ICD-10-CM

## 2022-06-16 DIAGNOSIS — I51.7 MODERATE CONCENTRIC LEFT VENTRICULAR HYPERTROPHY: ICD-10-CM

## 2022-06-16 DIAGNOSIS — Z79.4 TYPE 2 DIABETES MELLITUS WITHOUT COMPLICATION, WITH LONG-TERM CURRENT USE OF INSULIN (HCC): ICD-10-CM

## 2022-06-16 DIAGNOSIS — E78.2 MIXED HYPERLIPIDEMIA: ICD-10-CM

## 2022-06-16 DIAGNOSIS — R94.39 ABNORMAL STRESS ECHOCARDIOGRAM: Primary | ICD-10-CM

## 2022-06-16 DIAGNOSIS — I25.10 CORONARY ARTERY CALCIFICATION SEEN ON CT SCAN: ICD-10-CM

## 2022-06-16 DIAGNOSIS — I10 PRIMARY HYPERTENSION: ICD-10-CM

## 2022-06-16 PROCEDURE — 99214 OFFICE O/P EST MOD 30 MIN: CPT | Performed by: INTERNAL MEDICINE

## 2022-06-16 NOTE — PATIENT INSTRUCTIONS
Continue current medications without change  Cholesterol from 7/6/2021 looked great  Call me with any change in symptoms  Would plan repeat echocardiogram 1/2023

## 2022-06-16 NOTE — PROGRESS NOTES
Cardiology Office Visit    Lydia Sandhu  [id number]  1955    [facility] [address]  [facility] [address]  [phone]      Dear Tania Lazo DO,    I had the pleasure of seeing your patient at our [address] [facility] office today 6/16/2022  As you know she is a pleasant 75 y o  female with a medical history as described below  Reason for office visit:  Follow up hypertension, hyperlipidemia, abnormal stress echocardiogram and abnormal CT findings  1  Abnormal stress echocardiogram  Assessment & Plan:  Patient initially presented to her PCP with complaints of right-sided chest discomfort  CT scan of the chest was ordered which showed severe coronary atherosclerotic disease  Patient had stress echocardiogram 9/22/2020 and showed no definitive evidence of ischemia at submaximal heart rate (84% MPHR vs 85% expected)  Stress echocardiogram was notable for moderate concentric left ventricular hypertrophy as well as mild mid cavitary obstruction with Valsalva  Patient did and continues to  note dyspnea on exertion (reports stable and actually improved since last visit) but denies any chest pain  Echocardiogram 12/1/2020 showed mild cLVH and a mid cavitary MG of 4 mmHg  Patients blood pressure was not well controlled and likely LVH was due to this however may consider cardiac MRI to assess for hypertrophic cardiomyopathy  Echocardiogram 1/13/2022 showed no mid cavitary gradient or LVOT gradient  See further discussion below  2  Moderate concentric left ventricular hypertrophy  Assessment & Plan: Moderate concentric left ventricular hypertrophy noted on echocardiogram pre stress echocardiogram 09/22/2020  IVSd 1 4 cm  LVPWd 1 5 cm  Repeat echocardiogram on 12/1/2020 showed only mild cLVH with IVSd 1 1 and LVPWd 1 1    Blood pressure is well controlled on exam    Repeat echocardiogram 1/13/2022 showed top normal wall thickness  3  Left ventricular outflow obstruction  Assessment & Plan:  Stress echocardiogram 9/22/2020 suggested mild mid cavitary obstruction with Valsalva  No definitive LVOT obstruction was noted  Please see full discussion under abnormal stress echocardiogram and moderate concentric left ventricular hypertrophy  Similar mild mid cavitary obstruction with Valsalva noted on echo 12/1/2020  Repeat study 1/13/2022 showed no obstruction  Orders:  -     Echo complete w/ contrast if indicated; Future; Expected date: 01/16/2023    4  Coronary artery calcification seen on CT scan  Assessment & Plan:  CT scan 9/04/2020 suggested severe coronary atherosclerotic disease  Non ischemic stress echocardiogram at 84% MPHR  Recommend aggressive risk factor modification  LDL goal < 70    BP ideally < 130/80  Would have a low threshold for coronary CT angiogram vs cardiac catheterization if she has progressive shortness of breath/dyspnea on exertion or chest pain concerning for underlying coronary artery disease  Can consider coronary calcium scoring as well for further risk stratification  Would add aspirin however she has an aspirin allergy (itching)  5  Primary hypertension  Assessment & Plan:  Blood pressure is well controlled today  Continue amlodipine 5 mg daily and losartan 100 mg daily  Orders:  -     POCT ECG    6  Mixed hyperlipidemia  Assessment & Plan:  Lipid panel 7/6/2021:     HDL 40  LDL 67  She is tolerating rosuvastatin 20 mg daily  Goal LDL < 70       7  Type 2 diabetes mellitus without complication, with long-term current use of insulin ([address] )  Assessment & Plan:  Managed by PCP  HPI     11/24/2020: Patient presents to the office for evaluation of abnormal stress echocardiogram and coronary artery disease  Patient underwent a CT scan for right-sided chest pain in her right rib area    CT scan dated 9/04/2020 showed severe coronary atherosclerotic disease  Patient subsequently underwent stress echocardiogram 9/22/2020 which did not show any inducible ischemia but was notable for a hypertensive blood pressure response to exercise  Of note the patient did not take her blood pressure medications the day of the test   Stress echocardiogram did suggest hyperdynamic LV systolic function with an EF greater than 70% as well as moderate concentric left ventricular hypertrophy and mild mid cavitary obstruction with Valsalva  Patient does tell me that she has had high blood pressure for at least 10 years  Amlodipine has been added over the last year or so  Blood pressures at home remain elevated in the 160s  She does note some dyspnea on exertion when she goes up and down approximately 3 flights of stairs  She denies any chest pain  She does have some intermittent right shoulder discomfort  She does describe occasionally hearing a hissing in her left ear  The patient does note trouble sleeping and decreased energy/fatigue  She also reports intermittent heartburn as well as alternating constipation/diarrhea  She does get up frequently at night to urinate  She also reports intermittent headaches as well as anxiety  She does have arthritis  She reports intermittent back pain  1/13/2021:  Patient presents to the office today for follow up  She denies any chest pain or shortness of breath  Dyspnea on exertion seems better from prior office visit  Echocardiogram 12/1/2020 reviewed  Labs reviewed  HgA1c 7 6%  LDL 85  She reports blood pressure is better since increase in amlodipine  She has been diagnosed with macular degeneration  She cannot take aspirin due to pruritis  * Patient seen by Dominga Romero 10/21/2021 at which time she was doing well  6/16/2022: Patient presents to the office today for follow up  No chest pain  No shortness of breath  She does have some dyspnea on exertion with certain activities  She remains active   No palpitations  Intermittent sleep problems  Shoulder pain when she tries to lay on them  She does note some left arm pain which seems to occur at night  ECG today shows normal sinus rhythm at 65 bpm  Blood pressure has been well controlled         Patient Active Problem List   Diagnosis    Hypertension    Mixed hyperlipidemia    Type 2 diabetes mellitus without complication, with long-term current use of insulin ([place] [id number] )    Hypothyroid    Moderate concentric left ventricular hypertrophy    Left ventricular outflow obstruction    Coronary artery calcification seen on CT scan    Abnormal stress echocardiogram     Past Medical History:   Diagnosis Date    Arthritis     Basal cell carcinoma     Diabetes ([place] [address] )     Hypercholesteremia     Hypertension     Hypothyroid     Macular degeneration     Melanoma ([place] [address] )     Rosacea     Vertigo      Social History     Socioeconomic History    Marital status:      Spouse name: Not on file    Number of children: 2    Years of education: Not on file    Highest education level: Not on file   Occupational History    Not on file   Tobacco Use    Smoking status: Never Smoker    Smokeless tobacco: Never Used   Vaping Use    Vaping Use: Never used   Substance and Sexual Activity    Alcohol use: Never    Drug use: Never    Sexual activity: Not on file     Comment: Defer   Other Topics Concern    Not on file   Social History Narrative    Not on file     Social Determinants of Health     Financial Resource Strain: Not on file   Food Insecurity: Not on file   Transportation Needs: Not on file   Physical Activity: Not on file   Stress: Not on file   Social Connections: Not on file   Intimate Partner Violence: Not on file   Housing Stability: Not on file      Family History   Problem Relation Age of Onset    Diabetes Mother     Hyperlipidemia Mother     Hypertension Mother     Heart failure Mother     Heart attack Father         Suzy Dean Hypertension Father  Hypertension Brother     Diabetes Brother     Drug abuse Brother      Past Surgical History:   Procedure Laterality Date    CHOLECYSTECTOMY      HYSTERECTOMY      INCONTINENCE SURGERY      SKIN CANCER EXCISION      melanoma right arm  basal cell and squamous cell   TONSILLECTOMY         Current Outpatient Medications:     amLODIPine (NORVASC) 5 mg tablet, Take 1 tablet (5 mg total) by mouth daily, Disp: 90 tablet, Rfl: 3    BD Pen Needle Anna 2nd Gen 32G X 4 MM MISC, use with INSULIN PENS 5 PER DAY, Disp: , Rfl:     famotidine (PEPCID) 20 mg tablet, Take 20 mg by mouth 2 (two) times a day, Disp: , Rfl:     fluticasone (FLONASE) 50 mcg/act nasal spray, 1 spray into each nostril daily, Disp: , Rfl:     glucose blood (OneTouch Verio) test strip, Use up to 4 times a day  E11 9, Disp: , Rfl:     insulin aspart (NovoLOG FlexPen) 100 UNIT/ML injection pen, , Disp: , Rfl:     Lantus SoloStar 100 units/mL injection pen, Inject 40 Units under the skin daily In am, Disp: , Rfl:     levothyroxine 75 mcg tablet, Take by mouth, Disp: , Rfl:     losartan (COZAAR) 100 MG tablet, Take 1 tablet (100 mg total) by mouth daily, Disp: 90 tablet, Rfl: 3    Multiple Vitamins-Minerals (MULTIVITAMIN ADULT PO), Take by mouth, Disp: , Rfl:     Multiple Vitamins-Minerals (OCUVITE ADULT 50+ PO), Take by mouth For macular degeneration, Disp: , Rfl:     rosuvastatin (CRESTOR) 20 MG tablet, take 1 tablet by mouth once daily, Disp: 90 tablet, Rfl: 3       Allergies   Allergen Reactions    Lisinopril Cough    Naproxen Itching    Salicylates Itching         Cardiac Test  Results:     Echocardiogram 1/13/2022:   EF 75%  Minimal increase in velocity through the LVOT with Valsalva  No dynamic outflow tract gradient  Mild TR  Trace MR  Lipid panel 7/6/2021: C 135  T 164  H 40  L 67     Echocardiogram 12/1/2020:   EF 70-75%  Mildly increased wall thickness     Dynamic obstruction during Valsalva in the mid cavity with a mean gradient of 4 mmHg  Mild diastolic dysfunction  Mildly dilated left atrium  Trace MR  Trace TR  Lipid panel 12/24/2020: C 149  T 87  H 47  L 85  Stress echocardiogram 09/22/2020:  Bernie Lips  6:00  6 METs  84% MPHR  PVC's at rest    Test terminated due to hypertensive BP response to exercise with peak /92  Echocardiogram notable for EF >70% (hyperdynamic) with moderately increased wall thickness and mild mid cavitary obstruction with Valsalva  IVSd 1 4 cm  LVPWd 1 5 cm  Mild left atrial enlargement was also noted  Mild diastolic dysfunction  Aortic sclerosis without stenosis  CT chest 09/04/2020:  No acute cardiopulmonary process  There is severe coronary atherosclerotic disease  No focal abnormality involving the chest wall was identified (preliminary report)  Lipid panel 9/3/2019: C 146  T 112  H 40  L 84  EKG 07/31/2019:  Normal sinus rhythm  Normal EKG  Leftward axis  Review of Systems:    Review of Systems   Constitutional: Positive for fatigue  Negative for activity change and appetite change  HENT: Negative for congestion, hearing loss, tinnitus and trouble swallowing  Eyes: Negative for visual disturbance  Respiratory: Negative for cough, chest tightness, shortness of breath and wheezing  Dyspnea on exertion   Cardiovascular: Negative for chest pain, palpitations and leg swelling  Gastrointestinal: Negative for abdominal distention, abdominal pain, constipation, diarrhea, nausea and vomiting  Heartburn   Genitourinary: Negative for difficulty urinating  Nocturia   Musculoskeletal: Positive for arthralgias and back pain  Skin: Negative for rash  Neurological: Positive for headaches  Negative for dizziness, syncope and light-headedness  Hematological: Does not bruise/bleed easily  Psychiatric/Behavioral: Positive for sleep disturbance  Negative for confusion  The patient is nervous/anxious      All other systems reviewed and are negative  Vitals:    06/16/22 1243 06/16/22 1323   BP: 138/60 130/60   BP Location:  Left arm   Patient Position:  Sitting   Pulse: 69    SpO2: 96%    Weight: 83 1 kg (183 lb 3 2 oz)    Height: 5' 1" (1 549 m)      Vitals:    06/16/22 1243   Weight: 83 1 kg (183 lb 3 2 oz)     Height: 5' 1" (154 9 cm)     Physical Exam  Vitals reviewed  Constitutional:       Appearance: She is well-developed  HENT:      Head: Normocephalic and atraumatic  Eyes:      Conjunctiva/sclera: Conjunctivae normal       Pupils: Pupils are equal, round, and reactive to light  Neck:      Vascular: No JVD  Cardiovascular:      Rate and Rhythm: Normal rate and regular rhythm  Heart sounds: Murmur heard  No friction rub  No gallop  Pulmonary:      Effort: Pulmonary effort is normal       Breath sounds: Normal breath sounds  Abdominal:      General: Bowel sounds are normal       Palpations: Abdomen is soft  Musculoskeletal:      Cervical back: Normal range of motion  Skin:     General: Skin is warm and dry  Neurological:      Mental Status: She is alert and oriented to person, place, and time     Psychiatric:         Behavior: Behavior normal

## 2022-06-17 PROCEDURE — 93000 ELECTROCARDIOGRAM COMPLETE: CPT | Performed by: INTERNAL MEDICINE

## 2022-06-17 NOTE — ASSESSMENT & PLAN NOTE
CT scan 9/04/2020 suggested severe coronary atherosclerotic disease  Non ischemic stress echocardiogram at 84% MPHR  Recommend aggressive risk factor modification  LDL goal < 70    BP ideally < 130/80  Would have a low threshold for coronary CT angiogram vs cardiac catheterization if she has progressive shortness of breath/dyspnea on exertion or chest pain concerning for underlying coronary artery disease  Can consider coronary calcium scoring as well for further risk stratification  Would add aspirin however she has an aspirin allergy (itching)

## 2022-06-17 NOTE — ASSESSMENT & PLAN NOTE
Lipid panel 7/6/2021:     HDL 40  LDL 67  She is tolerating rosuvastatin 20 mg daily  Goal LDL < 70

## 2022-06-17 NOTE — ASSESSMENT & PLAN NOTE
Moderate concentric left ventricular hypertrophy noted on echocardiogram pre stress echocardiogram 09/22/2020  IVSd 1 4 cm  LVPWd 1 5 cm  Repeat echocardiogram on 12/1/2020 showed only mild cLVH with IVSd 1 1 and LVPWd 1 1  Blood pressure is well controlled on exam    Repeat echocardiogram 1/13/2022 showed top normal wall thickness

## 2022-06-17 NOTE — ASSESSMENT & PLAN NOTE
Patient initially presented to her PCP with complaints of right-sided chest discomfort  CT scan of the chest was ordered which showed severe coronary atherosclerotic disease  Patient had stress echocardiogram 9/22/2020 and showed no definitive evidence of ischemia at submaximal heart rate (84% MPHR vs 85% expected)  Stress echocardiogram was notable for moderate concentric left ventricular hypertrophy as well as mild mid cavitary obstruction with Valsalva  Patient did and continues to  note dyspnea on exertion (reports stable and actually improved since last visit) but denies any chest pain  Echocardiogram 12/1/2020 showed mild cLVH and a mid cavitary MG of 4 mmHg  Patients blood pressure was not well controlled and likely LVH was due to this however may consider cardiac MRI to assess for hypertrophic cardiomyopathy  Echocardiogram 1/13/2022 showed no mid cavitary gradient or LVOT gradient  See further discussion below

## 2022-06-17 NOTE — ASSESSMENT & PLAN NOTE
Stress echocardiogram 9/22/2020 suggested mild mid cavitary obstruction with Valsalva  No definitive LVOT obstruction was noted  Please see full discussion under abnormal stress echocardiogram and moderate concentric left ventricular hypertrophy  Similar mild mid cavitary obstruction with Valsalva noted on echo 12/1/2020  Repeat study 1/13/2022 showed no obstruction

## 2022-10-16 DIAGNOSIS — I51.7 MODERATE CONCENTRIC LEFT VENTRICULAR HYPERTROPHY: ICD-10-CM

## 2022-10-16 DIAGNOSIS — I10 PRIMARY HYPERTENSION: ICD-10-CM

## 2022-10-17 RX ORDER — AMLODIPINE BESYLATE 5 MG/1
TABLET ORAL
Qty: 90 TABLET | Refills: 3 | Status: SHIPPED | OUTPATIENT
Start: 2022-10-17

## 2023-01-13 DIAGNOSIS — I10 PRIMARY HYPERTENSION: ICD-10-CM

## 2023-01-13 RX ORDER — LOSARTAN POTASSIUM 100 MG/1
TABLET ORAL
Qty: 90 TABLET | Refills: 3 | Status: SHIPPED | OUTPATIENT
Start: 2023-01-13

## 2023-03-14 ENCOUNTER — HOSPITAL ENCOUNTER (OUTPATIENT)
Dept: NON INVASIVE DIAGNOSTICS | Facility: HOSPITAL | Age: 68
Discharge: HOME | End: 2023-03-14
Attending: INTERNAL MEDICINE

## 2023-03-14 VITALS
HEIGHT: 61 IN | WEIGHT: 183 LBS | HEART RATE: 70 BPM | DIASTOLIC BLOOD PRESSURE: 60 MMHG | BODY MASS INDEX: 34.55 KG/M2 | SYSTOLIC BLOOD PRESSURE: 130 MMHG

## 2023-03-14 DIAGNOSIS — Q24.8 LEFT VENTRICULAR OUTFLOW OBSTRUCTION: ICD-10-CM

## 2023-03-15 ENCOUNTER — TELEPHONE (OUTPATIENT)
Dept: CARDIOLOGY CLINIC | Facility: CLINIC | Age: 68
End: 2023-03-15

## 2023-03-15 LAB
AORTIC ROOT: 3 CM
APICAL FOUR CHAMBER EJECTION FRACTION: 79 %
E WAVE DECELERATION TIME: 244 MS
E/A RATIO: 0.74
FRACTIONAL SHORTENING: 54 (ref 28–44)
INTERVENTRICULAR SEPTUM IN DIASTOLE (PARASTERNAL SHORT AXIS VIEW): 1 CM
INTERVENTRICULAR SEPTUM: 1 CM (ref 0.6–1.1)
LAAS-AP4: 14.7 CM2
LEFT ATRIUM SIZE: 3.6 CM
LEFT INTERNAL DIMENSION IN SYSTOLE: 1.9 CM (ref 2.1–4)
LEFT VENTRICLE DIASTOLIC VOLUME (MOD BIPLANE): 51 ML
LEFT VENTRICLE SYSTOLIC VOLUME (MOD BIPLANE): 12 ML
LEFT VENTRICULAR INTERNAL DIMENSION IN DIASTOLE: 4.1 CM (ref 3.5–6)
LEFT VENTRICULAR POSTERIOR WALL IN END DIASTOLE: 1.1 CM
LEFT VENTRICULAR STROKE VOLUME: 63 ML
LV EF: 77 %
LVSV (TEICH): 63 ML
MV E'TISSUE VEL-LAT: 10 CM/S
MV E'TISSUE VEL-SEP: 7 CM/S
MV PEAK A VEL: 1.03 M/S
MV PEAK E VEL: 76 CM/S
MV STENOSIS PRESSURE HALF TIME: 71 MS
MV VALVE AREA P 1/2 METHOD: 3.1
RIGHT ATRIAL 2D VOLUME: 14 ML
RIGHT ATRIUM AREA SYSTOLE A4C: 8.7 CM2
RIGHT VENTRICLE ID DIMENSION: 2.9 CM
SL CV LV EF: 65
SL CV PED ECHO LEFT VENTRICLE DIASTOLIC VOLUME (MOD BIPLANE) 2D: 75 ML
SL CV PED ECHO LEFT VENTRICLE SYSTOLIC VOLUME (MOD BIPLANE) 2D: 12 ML
TRICUSPID ANNULAR PLANE SYSTOLIC EXCURSION: 1.9 CM

## 2023-03-15 NOTE — TELEPHONE ENCOUNTER
----- Message from Manasa Baron sent at 3/15/2023  1:53 PM EDT -----  Please let patient know that her echocardiogram shows normal heart function with no significant change from her prior echo  Will review in detail at follow up    Thank you!!

## 2023-05-05 ENCOUNTER — OFFICE VISIT (OUTPATIENT)
Dept: CARDIOLOGY CLINIC | Facility: CLINIC | Age: 68
End: 2023-05-05
Payer: MEDICARE

## 2023-05-05 VITALS
TEMPERATURE: 98.6 F | WEIGHT: 184 LBS | SYSTOLIC BLOOD PRESSURE: 150 MMHG | HEART RATE: 72 BPM | OXYGEN SATURATION: 95 % | DIASTOLIC BLOOD PRESSURE: 70 MMHG | HEIGHT: 61 IN | RESPIRATION RATE: 12 BRPM | BODY MASS INDEX: 34.74 KG/M2

## 2023-05-05 DIAGNOSIS — I51.7 MODERATE CONCENTRIC LEFT VENTRICULAR HYPERTROPHY: ICD-10-CM

## 2023-05-05 DIAGNOSIS — R94.39 ABNORMAL STRESS ECHOCARDIOGRAM: ICD-10-CM

## 2023-05-05 DIAGNOSIS — E03.9 HYPOTHYROIDISM, UNSPECIFIED TYPE: ICD-10-CM

## 2023-05-05 DIAGNOSIS — I10 PRIMARY HYPERTENSION: Primary | ICD-10-CM

## 2023-05-05 DIAGNOSIS — I25.10 CORONARY ARTERY CALCIFICATION SEEN ON CT SCAN: ICD-10-CM

## 2023-05-05 DIAGNOSIS — Q24.8 LEFT VENTRICULAR OUTFLOW OBSTRUCTION: ICD-10-CM

## 2023-05-05 DIAGNOSIS — E11.9 TYPE 2 DIABETES MELLITUS WITHOUT COMPLICATION, WITH LONG-TERM CURRENT USE OF INSULIN (HCC): ICD-10-CM

## 2023-05-05 DIAGNOSIS — Z79.4 TYPE 2 DIABETES MELLITUS WITHOUT COMPLICATION, WITH LONG-TERM CURRENT USE OF INSULIN (HCC): ICD-10-CM

## 2023-05-05 DIAGNOSIS — E78.2 MIXED HYPERLIPIDEMIA: ICD-10-CM

## 2023-05-05 PROCEDURE — 99214 OFFICE O/P EST MOD 30 MIN: CPT | Performed by: NURSE PRACTITIONER

## 2023-05-05 RX ORDER — ROSUVASTATIN CALCIUM 20 MG/1
20 TABLET, COATED ORAL DAILY
Qty: 90 TABLET | Refills: 3 | Status: SHIPPED | OUTPATIENT
Start: 2023-05-05

## 2023-05-05 NOTE — PATIENT INSTRUCTIONS
Continue current regimen. Please monitor BP periodically and report BP persistently > 140/90. Your echocardiogram shows normal heart function with normal wall thickness.

## 2023-08-06 NOTE — ASSESSMENT & PLAN NOTE
Moderate concentric left ventricular hypertrophy noted on echocardiogram pre stress echocardiogram 09/22/2020. IVSd 1.4 cm. LVPWd 1.5 cm. Repeat echocardiogram on 12/1/2020 showed only mild cLVH with IVSd 1.1 and LVPWd 1.1. Repeat echocardiogram 1/13/2022 showed top normal wall thickness. Repeat echo 3/14/2023 again shows top normal wall thickness. Will continue with BP optimization.

## 2023-08-06 NOTE — ASSESSMENT & PLAN NOTE
CT scan 9/04/2020 suggested “severe coronary atherosclerotic disease”. Non ischemic stress echocardiogram at 84% MPHR. Recommend aggressive risk factor modification. LDL goal < 70.   BP ideally < 130/80. Would have a low threshold for coronary CT angiogram vs cardiac catheterization if she has progressive shortness of breath/dyspnea on exertion or chest pain concerning for underlying coronary artery disease. Can consider coronary calcium scoring as well for further risk stratification. Would add aspirin however she has an aspirin allergy (itching).

## 2023-08-06 NOTE — ASSESSMENT & PLAN NOTE
Managed by PCP. Improved on 9/2022 labs. Reviewed importance of glucose control for cardiovascular protection.

## 2023-08-06 NOTE — ASSESSMENT & PLAN NOTE
Lipid panel 9/6/2022: C 141. T 150. H 41. L 70. She is tolerating rosuvastatin 20 mg daily  Goal LDL < 70.   Repeat lipid panel 9/2023

## 2023-08-06 NOTE — ASSESSMENT & PLAN NOTE
Patient initially presented to her PCP in 2020 with complaints of right-sided chest discomfort. CT scan of the chest was ordered which showed “severe coronary atherosclerotic disease”. Patient had stress echocardiogram 9/22/2020 and showed no definitive evidence of ischemia at submaximal heart rate (84% MPHR vs 85% expected). Stress echocardiogram was notable for moderate concentric left ventricular hypertrophy as well as mild mid cavitary obstruction with Valsalva. Echocardiogram 12/1/2020 showed mild cLVH and a mid cavitary MG of 4 mmHg. Patients blood pressure was not well controlled and likely LVH was due to this however may consider cardiac MRI to assess for hypertrophic cardiomyopathy. Echocardiogram 1/13/2022 showed no mid cavitary gradient or LVOT gradient. See further discussion below.

## 2023-08-06 NOTE — ASSESSMENT & PLAN NOTE
Stress echocardiogram 9/22/2020 suggested mild mid cavitary obstruction with Valsalva. No definitive LVOT obstruction was noted. Please see full discussion under abnormal stress echocardiogram and moderate concentric left ventricular hypertrophy. Similar mild mid cavitary obstruction with Valsalva noted on echo 12/1/2020. Repeat study 1/13/2022 showed no obstruction. Repeat echo 3/14/2023 again showed no evidence of LVOT obstruction. Currently without symptoms of concern. Will monitor.

## 2023-08-06 NOTE — PROGRESS NOTES
Cardiology Follow Up    Breann Bautista  1955  [id number]  [facility]  [address]  [address] [facility] [place]  [phone]    Mirtha Michaels presents today for routine follow up of HTN, HLD, and abnormal stress echo with LVH and left ventricular outflow obstruction. 1. Primary hypertension  Assessment & Plan:  Blood pressure is above goal today but she reports good control at home. Goal BP < 130/80. Continue amlodipine 5 mg daily and losartan 100 mg daily. She will monitor at home and report readings persistently above goal.  Encouraged 2 g sodium diet, weight control, regular exercise. 2. Abnormal stress echocardiogram  Assessment & Plan:  Patient initially presented to her PCP in 2020 with complaints of right-sided chest discomfort. CT scan of the chest was ordered which showed “severe coronary atherosclerotic disease”. Patient had stress echocardiogram 9/22/2020 and showed no definitive evidence of ischemia at submaximal heart rate (84% MPHR vs 85% expected). Stress echocardiogram was notable for moderate concentric left ventricular hypertrophy as well as mild mid cavitary obstruction with Valsalva. Echocardiogram 12/1/2020 showed mild cLVH and a mid cavitary MG of 4 mmHg. Patients blood pressure was not well controlled and likely LVH was due to this however may consider cardiac MRI to assess for hypertrophic cardiomyopathy. Echocardiogram 1/13/2022 showed no mid cavitary gradient or LVOT gradient. See further discussion below. 3. Left ventricular outflow obstruction  Assessment & Plan:  Stress echocardiogram 9/22/2020 suggested mild mid cavitary obstruction with Valsalva. No definitive LVOT obstruction was noted. Please see full discussion under abnormal stress echocardiogram and moderate concentric left ventricular hypertrophy. Similar mild mid cavitary obstruction with Valsalva noted on echo 12/1/2020.   Repeat study 1/13/2022 showed no obstruction. Repeat echo 3/14/2023 again showed no evidence of LVOT obstruction. Currently without symptoms of concern. Will monitor. 4. Moderate concentric left ventricular hypertrophy  Assessment & Plan: Moderate concentric left ventricular hypertrophy noted on echocardiogram pre stress echocardiogram 09/22/2020. IVSd 1.4 cm. LVPWd 1.5 cm. Repeat echocardiogram on 12/1/2020 showed only mild cLVH with IVSd 1.1 and LVPWd 1.1. Repeat echocardiogram 1/13/2022 showed top normal wall thickness. Repeat echo 3/14/2023 again shows top normal wall thickness. Will continue with BP optimization. 5. Mixed hyperlipidemia  Assessment & Plan:  Lipid panel 9/6/2022: C 141. T 150. H 41. L 70. She is tolerating rosuvastatin 20 mg daily  Goal LDL < 70. Repeat lipid panel 9/2023    Orders:  -     rosuvastatin (CRESTOR) 20 MG tablet; Take 1 tablet (20 mg total) by mouth daily    6. Coronary artery calcification seen on CT scan  Assessment & Plan:  CT scan 9/04/2020 suggested “severe coronary atherosclerotic disease”. Non ischemic stress echocardiogram at 84% MPHR. Recommend aggressive risk factor modification. LDL goal < 70.   BP ideally < 130/80. Would have a low threshold for coronary CT angiogram vs cardiac catheterization if she has progressive shortness of breath/dyspnea on exertion or chest pain concerning for underlying coronary artery disease. Can consider coronary calcium scoring as well for further risk stratification. Would add aspirin however she has an aspirin allergy (itching). 7. Type 2 diabetes mellitus without complication, with long-term current use of insulin ([address])  Assessment & Plan:  Managed by PCP. Improved on 9/2022 labs. Reviewed importance of glucose control for cardiovascular protection. 8. Hypothyroidism, unspecified type  Assessment & Plan:  Managed by PCP  On levothyroxine 75 mcg daily.   TSH at goal on 9/2022 labs       HPI  Tanja Rosario has a past medical history of HTN, HLD, T2DM, hypothyroid, melanoma, gallbladder disease, abnormal stress echo, LVH, and coronary artery calcification seen on CT.     She initially was referred to Dr. Jovon Devine for evaluation of abnormal stress echocardiogram. She was undergoing work up for right sided abdominal/chest pain. CT of her chest revealed "severe coronary atherosclerotic disease". A stress echo was ordered which showed no definitive ischemia at submaximal heart rate (85% MPHR vs 85% expected). She did have evidence of moderate LVH and mild mid cavitary obstruction with Valsalva. Echocardiogram 1/13/2022 showed EF 75% with no turbulence seen through the LVOT at rest and minimal increase in velocity with valsalva. No significant dynamic outflow tract gradient was identified. She followed up most recently with Dr. Jovon Devine on 6/16/2022 at which time she noted dyspnea with certain activities and left arm pain at night. No chest pain or shortness of breath. ECG showed NSR, rate 65 bpm. BP was well controlled. Echocardiogram 3/14/2023 shows EF 65% with wall thickness upper limits of normal and no turbulence seen through the LVOT at rest or with Valsalva.     5/5/2023: Marlee Benitez presents today for routine follow up. We reviewed the results of her echocardiogram. She states that she has been doing very well. No chest pain, shortness of breath, edema. She reports good BP control at home. She completed labs in 9/2022 and will be going for follow up labs in the fall with her PCP again.      Medical Problems     Problem List     Hypertension    Moderate concentric left ventricular hypertrophy    Left ventricular outflow obstruction    Abnormal stress echocardiogram    Coronary artery calcification seen on CT scan    Mixed hyperlipidemia    Type 2 diabetes mellitus without complication, with long-term current use of insulin ([address])    Hypothyroid        Past Medical History:   Diagnosis Date   • Arthritis    • Basal cell carcinoma    • Diabetes ([address] )    • Hypercholesteremia    • Hypertension    • Hypothyroid    • Macular degeneration    • Melanoma ([address] )    • Rosacea    • Vertigo      Social History     Socioeconomic History   • Marital status:      Spouse name: Not on file   • Number of children: 2   • Years of education: Not on file   • Highest education level: Not on file   Occupational History   • Not on file   Tobacco Use   • Smoking status: Never   • Smokeless tobacco: Never   Vaping Use   • Vaping Use: Never used   Substance and Sexual Activity   • Alcohol use: Never   • Drug use: Never   • Sexual activity: Not on file     Comment: Defer   Other Topics Concern   • Not on file   Social History Narrative   • Not on file     Social Determinants of Health     Financial Resource Strain: Not on file   Food Insecurity: Not on file   Transportation Needs: Not on file   Physical Activity: Not on file   Stress: Not on file   Social Connections: Not on file   Intimate Partner Violence: Not on file   Housing Stability: Not on file      Family History   Problem Relation Age of Onset   • Diabetes Mother    • Hyperlipidemia Mother    • Hypertension Mother    • Heart failure Mother    • Heart attack Father         Unsure   • Hypertension Father    • Hypertension Brother    • Diabetes Brother    • Drug abuse Brother      Past Surgical History:   Procedure Laterality Date   • CHOLECYSTECTOMY     • HYSTERECTOMY     • INCONTINENCE SURGERY     • MOHS SURGERY      on face, [facility]   • SKIN CANCER EXCISION      melanoma right arm. basal cell and squamous cell.    • TONSILLECTOMY         Current Outpatient Medications:   •  amLODIPine (NORVASC) 5 mg tablet, take 1 tablet by mouth once daily, Disp: 90 tablet, Rfl: 3  •  BD Pen Needle Anna 2nd Gen 32G X 4 MM MISC, use with INSULIN PENS 5 PER DAY, Disp: , Rfl:   •  fluticasone (FLONASE) 50 mcg/act nasal spray, 1 spray into each nostril daily, Disp: , Rfl:   •  glucose blood (OneTouch Verio) test strip, Use up to 4 times a day  E11.9, Disp: , Rfl:   •  insulin aspart (NovoLOG FlexPen) 100 UNIT/ML injection pen, Inject 30 Units under the skin 3 (three) times a day with meals, Disp: , Rfl:   •  Lantus SoloStar 100 units/mL injection pen, Inject 45 Units under the skin daily In am, Disp: , Rfl:   •  levothyroxine 75 mcg tablet, Take by mouth, Disp: , Rfl:   •  losartan (COZAAR) 100 MG tablet, take 1 tablet by mouth once daily, Disp: 90 tablet, Rfl: 3  •  Multiple Vitamins-Minerals (OCUVITE ADULT 50+ PO), Take by mouth For macular degeneration, Disp: , Rfl:   •  rosuvastatin (CRESTOR) 20 MG tablet, Take 1 tablet (20 mg total) by mouth daily, Disp: 90 tablet, Rfl: 3  Allergies   Allergen Reactions   • Lisinopril Cough   • Naproxen Itching   • Salicylates Itching     Cardiac testing:  Lipid panel 9/6/2022: C 141. T 150. H 41. L 70. Echocardiogram 3/14/2023:  EF 65%. Normal LV wall thickness with no turbulence seen through the LVOT at rest or with Valsalva. Grade 1 DD. LA mildly dilated. Trace TR. Trace MA. ECG 6/16/2022: Normal sinus rhythm. Normal ECG. Echocardiogram 1/13/2022:   EF 75%. Minimal increase in velocity through the LVOT with Valsalva. No dynamic outflow tract gradient. Mild TR. Trace MR.      Lipid panel 7/6/2021: C 135. T 164. H 40. L 67.     Echocardiogram 12/1/2020:   EF 70-75%. Mildly increased wall thickness. Dynamic obstruction during Valsalva in the mid cavity with a mean gradient of 4 mmHg. Mild diastolic dysfunction. Mildly dilated left atrium. Trace MR. Trace TR.      Lipid panel 12/24/2020: C 149. T 87. H 47. L 85.     Stress echocardiogram 09/22/2020:  Malathi David. 6:00. 6 METs. 84% MPHR. PVC's at rest.   Test terminated due to hypertensive BP response to exercise with peak /92. Echocardiogram notable for EF >70% (hyperdynamic) with moderately increased wall thickness and mild mid cavitary obstruction with Valsalva. IVSd 1.4 cm.  LVPWd 1. 5 cm. Mild left atrial enlargement was also noted. Mild diastolic dysfunction. Aortic sclerosis without stenosis.      CT chest 09/04/2020:  No acute cardiopulmonary process. There is severe coronary atherosclerotic disease. No focal abnormality involving the chest wall was identified (preliminary report).     Lipid panel 9/3/2019: C 146. T 112. H 40. L 84.      EKG 07/31/2019:  Normal sinus rhythm. Normal EKG. Leftward axis. Review of Systems   Constitutional: Negative. HENT: Negative. Cardiovascular: Negative for chest pain, dyspnea on exertion, irregular heartbeat, leg swelling, near-syncope, orthopnea and palpitations. Respiratory: Negative for cough and snoring. Endocrine: Negative. Skin: Negative. Musculoskeletal: Negative. Gastrointestinal: Negative. Genitourinary: Negative. Neurological: Negative. Psychiatric/Behavioral: Negative. Vitals:    05/05/23 1548   BP: 150/70   Pulse: 72   Resp: 12   Temp: 98.6 °F (37 °C)   SpO2: 95%     Vitals:    05/05/23 1548   Weight: 83.5 kg (184 lb)     Height: 5' 1" (154.9 cm)   Body mass index is 34.77 kg/m². Physical Exam  Vitals and nursing note reviewed. Constitutional:       General: She is not in acute distress. Appearance: She is well-developed. She is obese. She is not diaphoretic. HENT:      Head: Normocephalic and atraumatic. Neck:      Thyroid: No thyromegaly. Vascular: No carotid bruit or JVD. Cardiovascular:      Rate and Rhythm: Normal rate and regular rhythm. Pulses: Intact distal pulses. Radial pulses are 2+ on the right side and 2+ on the left side. Heart sounds: Normal heart sounds, S1 normal and S2 normal. No murmur heard. Comments: No edema  Pulmonary:      Effort: Pulmonary effort is normal.      Breath sounds: Normal breath sounds. Abdominal:      General: There is no distension. Palpations: Abdomen is soft. Tenderness:  There is no abdominal tenderness. Musculoskeletal:         General: Normal range of motion. Cervical back: Normal range of motion and neck supple. Lymphadenopathy:      Cervical: No cervical adenopathy. Skin:     General: Skin is warm and dry. Neurological:      Mental Status: She is alert and oriented to person, place, and time. Cranial Nerves: No cranial nerve deficit.    Psychiatric:         Behavior: Behavior normal.

## 2023-08-06 NOTE — ASSESSMENT & PLAN NOTE
Blood pressure is above goal today but she reports good control at home. Goal BP < 130/80. Continue amlodipine 5 mg daily and losartan 100 mg daily. She will monitor at home and report readings persistently above goal.  Encouraged 2 g sodium diet, weight control, regular exercise.

## 2023-10-11 DIAGNOSIS — I10 PRIMARY HYPERTENSION: ICD-10-CM

## 2023-10-11 DIAGNOSIS — I51.7 MODERATE CONCENTRIC LEFT VENTRICULAR HYPERTROPHY: ICD-10-CM

## 2023-10-11 RX ORDER — AMLODIPINE BESYLATE 5 MG/1
TABLET ORAL
Qty: 90 TABLET | Refills: 3 | Status: SHIPPED | OUTPATIENT
Start: 2023-10-11

## 2023-10-11 NOTE — TELEPHONE ENCOUNTER
I received refill request. Patient is scheduled for November with Dr. Elza Morales. Can you please reach out to get this rescheduled with me or a physician? Thank you! Difficulty concentrating/Difficulty making decisions/Difficulty remembering

## 2023-12-29 ENCOUNTER — OFFICE VISIT (OUTPATIENT)
Dept: CARDIOLOGY CLINIC | Facility: CLINIC | Age: 68
End: 2023-12-29
Payer: MEDICARE

## 2023-12-29 VITALS
OXYGEN SATURATION: 98 % | HEART RATE: 71 BPM | SYSTOLIC BLOOD PRESSURE: 128 MMHG | WEIGHT: 189 LBS | TEMPERATURE: 97.2 F | DIASTOLIC BLOOD PRESSURE: 70 MMHG | HEIGHT: 61 IN | BODY MASS INDEX: 35.68 KG/M2

## 2023-12-29 DIAGNOSIS — I51.7 MODERATE CONCENTRIC LEFT VENTRICULAR HYPERTROPHY: ICD-10-CM

## 2023-12-29 DIAGNOSIS — Q24.8 LEFT VENTRICULAR OUTFLOW OBSTRUCTION: ICD-10-CM

## 2023-12-29 DIAGNOSIS — E78.2 MIXED HYPERLIPIDEMIA: ICD-10-CM

## 2023-12-29 DIAGNOSIS — R94.39 ABNORMAL STRESS ECHOCARDIOGRAM: ICD-10-CM

## 2023-12-29 DIAGNOSIS — Z79.4 TYPE 2 DIABETES MELLITUS WITHOUT COMPLICATION, WITH LONG-TERM CURRENT USE OF INSULIN (HCC): ICD-10-CM

## 2023-12-29 DIAGNOSIS — I25.10 CORONARY ARTERY CALCIFICATION SEEN ON CT SCAN: ICD-10-CM

## 2023-12-29 DIAGNOSIS — E03.9 HYPOTHYROIDISM, UNSPECIFIED TYPE: ICD-10-CM

## 2023-12-29 DIAGNOSIS — E11.9 TYPE 2 DIABETES MELLITUS WITHOUT COMPLICATION, WITH LONG-TERM CURRENT USE OF INSULIN (HCC): ICD-10-CM

## 2023-12-29 DIAGNOSIS — I10 PRIMARY HYPERTENSION: Primary | ICD-10-CM

## 2023-12-29 PROCEDURE — 93000 ELECTROCARDIOGRAM COMPLETE: CPT | Performed by: NURSE PRACTITIONER

## 2023-12-29 PROCEDURE — 99214 OFFICE O/P EST MOD 30 MIN: CPT | Performed by: NURSE PRACTITIONER

## 2023-12-29 RX ORDER — LOSARTAN POTASSIUM 100 MG/1
100 TABLET ORAL DAILY
Qty: 90 TABLET | Refills: 3 | Status: SHIPPED | OUTPATIENT
Start: 2023-12-29

## 2023-12-29 NOTE — PROGRESS NOTES
Cardiology Follow Up    Carli Hernández  1955  [id number]  [facility] [place] [address]  [address]  [place] [address] [phone]    Carli presents today for routine follow up of HTN, HLD, and abnormal stress echo with LVH and left ventricular outflow obstruction.     1. Primary hypertension  Assessment & Plan:  Blood pressure is excellent on my check today.  Goal BP < 130/80.  Continue amlodipine 5 mg daily and losartan 100 mg daily.  Encouraged 2 g sodium diet, weight control, regular exercise.  Recent lab work reviewed.    Orders:  -     losartan (COZAAR) 100 MG tablet; Take 1 tablet (100 mg total) by mouth daily  -     POCT ECG    2. Moderate concentric left ventricular hypertrophy  Assessment & Plan:  Moderate concentric left ventricular hypertrophy noted on echocardiogram pre stress echocardiogram 09/22/2020.  IVSd 1.4 cm. LVPWd 1.5 cm.   Repeat echocardiogram on 12/1/2020 showed only mild cLVH with IVSd 1.1 and LVPWd 1.1.  Repeat echocardiogram 1/13/2022 showed top normal wall thickness.   Repeat echo 3/14/2023 again shows top normal wall thickness.  BP has been excellent on recent readings.  Plan updated echo in spring 2025 or sooner if symptoms warrant.      3. Left ventricular outflow obstruction  Assessment & Plan:  Stress echocardiogram 9/22/2020 suggested mild mid cavitary obstruction with Valsalva.  No definitive LVOT obstruction was noted.  Please see full discussion under abnormal stress echocardiogram and moderate concentric left ventricular hypertrophy.  Similar mild mid cavitary obstruction with Valsalva noted on echo 12/1/2020.  Repeat study 1/13/2022 showed no obstruction.   Repeat echo 3/14/2023 again showed no evidence of LVOT obstruction.  Currently without symptoms of concern.  Will monitor.      4. Abnormal stress echocardiogram  Assessment & Plan:  Patient initially presented to her PCP in 2020 with complaints of  right-sided chest discomfort.  CT scan of the chest was ordered which showed “severe coronary atherosclerotic disease”.  Patient had stress echocardiogram 9/22/2020 and showed no definitive evidence of ischemia at submaximal heart rate (84% MPHR vs 85% expected).  Stress echocardiogram was notable for moderate concentric left ventricular hypertrophy as well as mild mid cavitary obstruction with Valsalva.  Echocardiogram 12/1/2020 showed mild cLVH and a mid cavitary MG of 4 mmHg.   Patients blood pressure was not well controlled and likely LVH was due to this however may consider cardiac MRI to assess for hypertrophic cardiomyopathy.   Echocardiogram 1/13/2022 showed no mid cavitary gradient or LVOT gradient.   See further discussion below.       5. Coronary artery calcification seen on CT scan  Assessment & Plan:  CT scan 9/04/2020 suggested “severe coronary atherosclerotic disease”.  Non ischemic stress echocardiogram at 84% MPHR.    Recommend aggressive risk factor modification.   LDL goal < 70.   BP ideally < 130/80.  Would have a low threshold for coronary CT angiogram vs cardiac catheterization if she has progressive shortness of breath/dyspnea on exertion or chest pain concerning for underlying coronary artery disease.  Would add aspirin however she has an aspirin allergy (itching).   At this time she adamantly denies anginal complaints.      6. Mixed hyperlipidemia  Assessment & Plan:  Lipid panel 9/6/2022: C 141. T 150. H 41. L 70.  Lipid panel 8/9/2023: C 145. T 114. H 42. L 80.  She is tolerating rosuvastatin 20 mg daily  Goal LDL < 70.  Plan repeat lipid panel in 6 months and will up-titrate rosuvastatin if LDL remains above goal.    Orders:  -     Lipid Panel with Direct LDL reflex; Future    7. Type 2 diabetes mellitus without complication, with long-term current use of insulin (HCC)  Assessment & Plan:  Managed by PCP.   7.4 on recent labs.  Reviewed importance of glucose control for cardiovascular  "protection.      8. Hypothyroidism, unspecified type  Assessment & Plan:  Managed by PCP  On levothyroxine 75 mcg daily.  TSH at goal on 9/2022 labs         HPI  Margaret has a past medical history of HTN, HLD, T2DM, hypothyroid, melanoma, gallbladder disease, abnormal stress echo, LVH, and coronary artery calcification seen on CT.     She initially was referred to Dr. Cobos for evaluation of abnormal stress echocardiogram. She was undergoing work up for right sided abdominal/chest pain. CT of her chest revealed \"severe coronary atherosclerotic disease\". A stress echo was ordered which showed no definitive ischemia at submaximal heart rate (85% MPHR vs 85% expected). She did have evidence of moderate LVH and mild mid cavitary obstruction with Valsalva.      Echocardiogram 1/13/2022 showed EF 75% with no turbulence seen through the LVOT at rest and minimal increase in velocity with valsalva. No significant dynamic outflow tract gradient was identified.     She followed up most recently with Dr. Cobos on 6/16/2022 at which time she noted dyspnea with certain activities and left arm pain at night. No chest pain or shortness of breath. ECG showed NSR, rate 65 bpm. BP was well controlled.     Echocardiogram 3/14/2023 shows EF 65% with wall thickness upper limits of normal and no turbulence seen through the LVOT at rest or with Valsalva.     She followed up most recently on 5/5/2023 at which time we reviewed the results of her echo. She was doing very well with good control of her blood pressure at home.     12/29/2023: Margaret presents for routine follow up. She had a busy holiday as her daughter was  on 12/23/23. She states she was able to keep up with the activity. She denies unusual fatigue. No chest discomfort or shortness of breath. No edema, palpitations, or lightheadedness. She reports stable dyspnea on exertion when carrying heavy items up the steps. She completed labs in August for her PCP and results are " reviewed with her today. Ha1c was 7.4. LDL 80. She has been monitoring her BP at home with readings in the 120/70's consistently.    Medical Problems       Problem List       Hypertension    Moderate concentric left ventricular hypertrophy    Left ventricular outflow obstruction    Abnormal stress echocardiogram    Coronary artery calcification seen on CT scan    Mixed hyperlipidemia    Type 2 diabetes mellitus without complication, with long-term current use of insulin (HCC)    Hypothyroid        Past Medical History:   Diagnosis Date    Arthritis     Basal cell carcinoma     Diabetes (HCC)     Hypercholesteremia     Hypertension     Hypothyroid     Macular degeneration     Melanoma (HCC)     Rosacea     Vertigo      Social History     Socioeconomic History    Marital status:      Spouse name: Not on file    Number of children: 2    Years of education: Not on file    Highest education level: Not on file   Occupational History    Not on file   Tobacco Use    Smoking status: Never    Smokeless tobacco: Never   Vaping Use    Vaping status: Never Used   Substance and Sexual Activity    Alcohol use: Never    Drug use: Never    Sexual activity: Not on file     Comment: Defer   Other Topics Concern    Not on file   Social History Narrative    Not on file     Social Determinants of Health     Financial Resource Strain: Not on file   Food Insecurity: No Food Insecurity (2/21/2023)    Received from [facility]    Hunger Vital Sign     Worried About Running Out of Food in the Last Year: Never true     Ran Out of Food in the Last Year: Never true   Transportation Needs: Not on file   Physical Activity: Not on file   Stress: Not on file   Social Connections: Not on file   Intimate Partner Violence: Not on file   Housing Stability: Not on file      Family History   Problem Relation Age of Onset    Diabetes Mother     Hyperlipidemia Mother     Hypertension Mother     Heart failure Mother     Heart attack Father          Unsure    Hypertension Father     Hypertension Brother     Diabetes Brother     Drug abuse Brother      Past Surgical History:   Procedure Laterality Date    CHOLECYSTECTOMY      HYSTERECTOMY      INCONTINENCE SURGERY      MOHS SURGERY      on face, [facility]    SKIN CANCER EXCISION      melanoma right arm. basal cell and squamous cell.    TONSILLECTOMY         Current Outpatient Medications:     amLODIPine (NORVASC) 5 mg tablet, take 1 tablet by mouth once daily, Disp: 90 tablet, Rfl: 3    BD Pen Needle Anna 2nd Gen 32G X 4 MM MISC, use with INSULIN PENS 5 PER DAY, Disp: , Rfl:     fluticasone (FLONASE) 50 mcg/act nasal spray, 1 spray into each nostril daily, Disp: , Rfl:     glucose blood (OneTouch Verio) test strip, Use up to 4 times a day  E11.9, Disp: , Rfl:     insulin aspart (NovoLOG FlexPen) 100 UNIT/ML injection pen, Inject 10 Units under the skin 3 (three) times a day with meals, Disp: , Rfl:     Lantus SoloStar 100 units/mL injection pen, Inject 45 Units under the skin daily In am, Disp: , Rfl:     levothyroxine 75 mcg tablet, Take by mouth, Disp: , Rfl:     losartan (COZAAR) 100 MG tablet, Take 1 tablet (100 mg total) by mouth daily, Disp: 90 tablet, Rfl: 3    Multiple Vitamins-Minerals (OCUVITE ADULT 50+ PO), Take by mouth For macular degeneration, Disp: , Rfl:     rosuvastatin (CRESTOR) 20 MG tablet, Take 1 tablet (20 mg total) by mouth daily, Disp: 90 tablet, Rfl: 3  Allergies   Allergen Reactions    Lisinopril Cough    Naproxen Itching    Salicylates Itching     Lipid panel 8/9/2023: C 145. T 114. H 42. L 80.    Cardiac testing:  ECG 12/29/2023: Normal sinus rhythm. Rate 63 bpm.    Lipid panel 9/6/2022: C 141. T 150. H 41. L 70.     Echocardiogram 3/14/2023:  EF 65%. Normal LV wall thickness with no turbulence seen through the LVOT at rest or with Valsalva. Grade 1 DD.  LA mildly dilated.  Trace TR. Trace IN.     ECG 6/16/2022: Normal sinus rhythm. Normal ECG.     Echocardiogram  "1/13/2022:   EF 75%.  Minimal increase in velocity through the LVOT with Valsalva.   No dynamic outflow tract gradient.   Mild TR.   Trace MR.      Lipid panel 7/6/2021: C 135. T 164. H 40. L 67.     Echocardiogram 12/1/2020:   EF 70-75%.  Mildly increased wall thickness.   Dynamic obstruction during Valsalva in the mid cavity with a mean gradient of 4 mmHg.   Mild diastolic dysfunction.   Mildly dilated left atrium.   Trace MR.   Trace TR.      Lipid panel 12/24/2020: C 149. T 87. H 47. L 85.     Stress echocardiogram 09/22/2020:  Diego. 6:00. 6 METs. 84% MPHR. PVC's at rest.   Test terminated due to hypertensive BP response to exercise with peak /92.   Echocardiogram notable for EF >70% (hyperdynamic) with moderately increased wall thickness and mild mid cavitary obstruction with Valsalva.   IVSd 1.4 cm. LVPWd 1.5 cm.   Mild left atrial enlargement was also noted.   Mild diastolic dysfunction.   Aortic sclerosis without stenosis.      CT chest 09/04/2020:  No acute cardiopulmonary process.  There is severe coronary atherosclerotic disease.  No focal abnormality involving the chest wall was identified (preliminary report).     Lipid panel 9/3/2019: C 146. T 112. H 40. L 84.      EKG 07/31/2019:  Normal sinus rhythm.  Normal EKG.  Leftward axis.     Review of Systems   Constitutional: Negative.   HENT: Negative.     Cardiovascular:  Positive for dyspnea on exertion (stable). Negative for chest pain, irregular heartbeat, leg swelling, near-syncope, orthopnea and palpitations.   Respiratory:  Negative for cough and snoring.    Endocrine: Negative.    Skin: Negative.    Musculoskeletal: Negative.    Gastrointestinal: Negative.    Genitourinary: Negative.    Neurological: Negative.    Psychiatric/Behavioral: Negative.         Vitals:    12/29/23 1330   BP: 128/70   Pulse:    Temp:    SpO2:      Vitals:    12/29/23 1257   Weight: 85.7 kg (189 lb)     Height: 5' 1\" (154.9 cm)   Body mass index is 35.71 " kg/m².    Physical Exam  Vitals and nursing note reviewed.   Constitutional:       General: She is not in acute distress.     Appearance: She is well-developed. She is obese. She is not diaphoretic.   HENT:      Head: Normocephalic and atraumatic.   Neck:      Thyroid: No thyromegaly.      Vascular: No carotid bruit or JVD.   Cardiovascular:      Rate and Rhythm: Normal rate and regular rhythm.      Pulses: Intact distal pulses.           Radial pulses are 2+ on the right side and 2+ on the left side.      Heart sounds: S1 normal and S2 normal. Murmur heard.      Comments: No edema  Pulmonary:      Effort: Pulmonary effort is normal.      Breath sounds: Normal breath sounds.   Abdominal:      General: There is no distension.      Palpations: Abdomen is soft.      Tenderness: There is no abdominal tenderness.   Musculoskeletal:         General: Normal range of motion.      Cervical back: Normal range of motion and neck supple.   Lymphadenopathy:      Cervical: No cervical adenopathy.   Skin:     General: Skin is warm and dry.   Neurological:      Mental Status: She is alert and oriented to person, place, and time.      Cranial Nerves: No cranial nerve deficit.   Psychiatric:         Behavior: Behavior normal.

## 2023-12-29 NOTE — ASSESSMENT & PLAN NOTE
Blood pressure is excellent on my check today.  Goal BP < 130/80.  Continue amlodipine 5 mg daily and losartan 100 mg daily.  Encouraged 2 g sodium diet, weight control, regular exercise.  Recent lab work reviewed.  
CT scan 9/04/2020 suggested “severe coronary atherosclerotic disease”.  Non ischemic stress echocardiogram at 84% MPHR.    Recommend aggressive risk factor modification.   LDL goal < 70.   BP ideally < 130/80.  Would have a low threshold for coronary CT angiogram vs cardiac catheterization if she has progressive shortness of breath/dyspnea on exertion or chest pain concerning for underlying coronary artery disease.  Would add aspirin however she has an aspirin allergy (itching).   At this time she adamantly denies anginal complaints.  
Lipid panel 9/6/2022: C 141. T 150. H 41. L 70.  Lipid panel 8/9/2023: C 145. T 114. H 42. L 80.  She is tolerating rosuvastatin 20 mg daily  Goal LDL < 70.  Plan repeat lipid panel in 6 months and will up-titrate rosuvastatin if LDL remains above goal.  
Managed by PCP  On levothyroxine 75 mcg daily.  TSH at goal on 9/2022 labs  
Managed by PCP.   7.4 on recent labs.  Reviewed importance of glucose control for cardiovascular protection.  
Moderate concentric left ventricular hypertrophy noted on echocardiogram pre stress echocardiogram 09/22/2020.  IVSd 1.4 cm. LVPWd 1.5 cm.   Repeat echocardiogram on 12/1/2020 showed only mild cLVH with IVSd 1.1 and LVPWd 1.1.  Repeat echocardiogram 1/13/2022 showed top normal wall thickness.   Repeat echo 3/14/2023 again shows top normal wall thickness.  BP has been excellent on recent readings.  Plan updated echo in spring 2025 or sooner if symptoms warrant.  
Patient initially presented to her PCP in 2020 with complaints of right-sided chest discomfort.  CT scan of the chest was ordered which showed “severe coronary atherosclerotic disease”.  Patient had stress echocardiogram 9/22/2020 and showed no definitive evidence of ischemia at submaximal heart rate (84% MPHR vs 85% expected).  Stress echocardiogram was notable for moderate concentric left ventricular hypertrophy as well as mild mid cavitary obstruction with Valsalva.  Echocardiogram 12/1/2020 showed mild cLVH and a mid cavitary MG of 4 mmHg.   Patients blood pressure was not well controlled and likely LVH was due to this however may consider cardiac MRI to assess for hypertrophic cardiomyopathy.   Echocardiogram 1/13/2022 showed no mid cavitary gradient or LVOT gradient.   See further discussion below.   
Stress echocardiogram 9/22/2020 suggested mild mid cavitary obstruction with Valsalva.  No definitive LVOT obstruction was noted.  Please see full discussion under abnormal stress echocardiogram and moderate concentric left ventricular hypertrophy.  Similar mild mid cavitary obstruction with Valsalva noted on echo 12/1/2020.  Repeat study 1/13/2022 showed no obstruction.   Repeat echo 3/14/2023 again showed no evidence of LVOT obstruction.  Currently without symptoms of concern.  Will monitor.  
EMT/paramedic

## 2023-12-29 NOTE — PATIENT INSTRUCTIONS
Your blood pressure was excellent on my check.  Notify me if you are seeing BP persistently > 135/80.  LDL was 80 on your recent labs. We will recheck in 6 months and adjust rosuvastatin if needed for goal LDL < 70.  Contact us with any progressive shortness of breath, chest discomfort, or swelling.

## 2024-07-02 ENCOUNTER — OFFICE VISIT (OUTPATIENT)
Dept: CARDIOLOGY CLINIC | Facility: CLINIC | Age: 69
End: 2024-07-02
Payer: MEDICARE

## 2024-07-02 VITALS
OXYGEN SATURATION: 98 % | WEIGHT: 188 LBS | HEART RATE: 78 BPM | BODY MASS INDEX: 35.5 KG/M2 | HEIGHT: 61 IN | DIASTOLIC BLOOD PRESSURE: 68 MMHG | SYSTOLIC BLOOD PRESSURE: 140 MMHG

## 2024-07-02 DIAGNOSIS — I10 PRIMARY HYPERTENSION: Primary | ICD-10-CM

## 2024-07-02 DIAGNOSIS — R09.89 BRUIT: ICD-10-CM

## 2024-07-02 DIAGNOSIS — R94.39 ABNORMAL STRESS ECHOCARDIOGRAM: ICD-10-CM

## 2024-07-02 DIAGNOSIS — Q24.8 LEFT VENTRICULAR OUTFLOW OBSTRUCTION: ICD-10-CM

## 2024-07-02 DIAGNOSIS — I35.8 AORTIC VALVE SCLEROSIS: ICD-10-CM

## 2024-07-02 DIAGNOSIS — I51.7 MODERATE CONCENTRIC LEFT VENTRICULAR HYPERTROPHY: ICD-10-CM

## 2024-07-02 DIAGNOSIS — I25.10 CORONARY ARTERY CALCIFICATION SEEN ON CT SCAN: ICD-10-CM

## 2024-07-02 DIAGNOSIS — E78.2 MIXED HYPERLIPIDEMIA: ICD-10-CM

## 2024-07-02 PROCEDURE — 99214 OFFICE O/P EST MOD 30 MIN: CPT | Performed by: NURSE PRACTITIONER

## 2024-07-02 RX ORDER — ROSUVASTATIN CALCIUM 40 MG/1
40 TABLET, COATED ORAL DAILY
Qty: 30 TABLET | Refills: 11 | Status: SHIPPED | OUTPATIENT
Start: 2024-07-02

## 2024-07-02 NOTE — ASSESSMENT & PLAN NOTE
CT scan 9/04/2020 suggested “severe coronary atherosclerotic disease”.  Non ischemic stress echocardiogram at 84% MPHR.    Recommend aggressive risk factor modification.   LDL goal < 70.   BP ideally < 130/80.  Would have a low threshold for coronary CT angiogram vs cardiac catheterization if she has progressive shortness of breath/dyspnea on exertion or chest pain concerning for underlying coronary artery disease.  Would add aspirin however she has an aspirin allergy (itching).   At this time she adamantly denies anginal complaints.

## 2024-07-02 NOTE — ASSESSMENT & PLAN NOTE
Blood pressure is borderline today but excellent on home readings.  Goal BP < 130/80.  Continue amlodipine 5 mg daily and losartan 100 mg daily.  Encouraged 2 g sodium diet, weight control, regular exercise.  Recent lab work reviewed.

## 2024-07-02 NOTE — ASSESSMENT & PLAN NOTE
Moderate concentric left ventricular hypertrophy noted on echocardiogram pre stress echocardiogram 09/22/2020.  IVSd 1.4 cm. LVPWd 1.5 cm.   Repeat echocardiogram on 12/1/2020 showed only mild cLVH with IVSd 1.1 and LVPWd 1.1.  Repeat echocardiogram 1/13/2022 showed top normal wall thickness.   Repeat echo 3/14/2023 again shows top normal wall thickness.  Will obtain updated echo given progressive murmur heard on exam.

## 2024-07-02 NOTE — PROGRESS NOTES
Cardiology Follow Up    Carli Hernández  1955  [id number]  [facility] [place] [address]  [address]  [facility] [address] [phone]    Carli presents today for routine follow up of HTN, HLD, and abnormal stress echo with LVH and left ventricular outflow obstruction.     1. Primary hypertension  Assessment & Plan:  Blood pressure is borderline today but excellent on home readings.  Goal BP < 130/80.  Continue amlodipine 5 mg daily and losartan 100 mg daily.  Encouraged 2 g sodium diet, weight control, regular exercise.  Recent lab work reviewed.  2. Moderate concentric left ventricular hypertrophy  Assessment & Plan:  Moderate concentric left ventricular hypertrophy noted on echocardiogram pre stress echocardiogram 09/22/2020.  IVSd 1.4 cm. LVPWd 1.5 cm.   Repeat echocardiogram on 12/1/2020 showed only mild cLVH with IVSd 1.1 and LVPWd 1.1.  Repeat echocardiogram 1/13/2022 showed top normal wall thickness.   Repeat echo 3/14/2023 again shows top normal wall thickness.  Will obtain updated echo given progressive murmur heard on exam.  Orders:  -     Echo complete w/ contrast if indicated; Future; Expected date: 07/02/2024  3. Left ventricular outflow obstruction  Assessment & Plan:  Stress echocardiogram 9/22/2020 suggested mild mid cavitary obstruction with Valsalva.  No definitive LVOT obstruction was noted.  Please see full discussion under abnormal stress echocardiogram and moderate concentric left ventricular hypertrophy.  Similar mild mid cavitary obstruction with Valsalva noted on echo 12/1/2020.  Repeat study 1/13/2022 showed no obstruction.   Repeat echo 3/14/2023 again showed no evidence of LVOT obstruction.  Currently without symptoms of concern.  Will monitor.  Orders:  -     Echo complete w/ contrast if indicated; Future; Expected date: 07/02/2024  4. Abnormal stress echocardiogram  Assessment & Plan:  Patient initially presented to her  PCP in 2020 with complaints of right-sided chest discomfort.  CT scan of the chest was ordered which showed “severe coronary atherosclerotic disease”.  Patient had stress echocardiogram 9/22/2020 and showed no definitive evidence of ischemia at submaximal heart rate (84% MPHR vs 85% expected).  Stress echocardiogram was notable for moderate concentric left ventricular hypertrophy as well as mild mid cavitary obstruction with Valsalva.  Echocardiogram 12/1/2020 showed mild cLVH and a mid cavitary MG of 4 mmHg.   Patients blood pressure was not well controlled and likely LVH was due to this however may consider cardiac MRI to assess for hypertrophic cardiomyopathy.   Echocardiogram 1/13/2022 showed no mid cavitary gradient or LVOT gradient.   See further discussion below.   5. Aortic valve sclerosis  Assessment & Plan:  Harsh systolic murmur heard on exam today.  Known aortic valve sclerosis.  Will obtain updated echo to re-evaluate the aortic valve.  6. Bruit  Assessment & Plan:  Carotid ultrasound to evaluate further  Orders:  -     VAS carotid complete study; Future; Expected date: 07/02/2024  7. Coronary artery calcification seen on CT scan  Assessment & Plan:  CT scan 9/04/2020 suggested “severe coronary atherosclerotic disease”.  Non ischemic stress echocardiogram at 84% MPHR.    Recommend aggressive risk factor modification.   LDL goal < 70.   BP ideally < 130/80.  Would have a low threshold for coronary CT angiogram vs cardiac catheterization if she has progressive shortness of breath/dyspnea on exertion or chest pain concerning for underlying coronary artery disease.  Would add aspirin however she has an aspirin allergy (itching).   At this time she adamantly denies anginal complaints.  8. Mixed hyperlipidemia  Assessment & Plan:  Lipid panel 8/9/2023: C 145. T 114. H 42. L 80.  Lipid panel 6/26/2024: C 144. T 107. H 45. L 78.  She is tolerating rosuvastatin 20 mg daily  Goal LDL < 70.  Trial titration of  "rosuvastatin to 40 mg daily.  Repeat lipid panel in 8-12 weeks.    Orders:  -     Lipid Panel with Direct LDL reflex; Future  -     rosuvastatin (CRESTOR) 40 MG tablet; Take 1 tablet (40 mg total) by mouth daily     ELISSA Robles has a past medical history of HTN, HLD, T2DM, hypothyroid, melanoma, gallbladder disease, abnormal stress echo, LVH, and coronary artery calcification seen on CT.     She initially was referred to Dr. Cobos for evaluation of abnormal stress echocardiogram. She was undergoing work up for right sided abdominal/chest pain. CT of her chest revealed \"severe coronary atherosclerotic disease\". A stress echo was ordered which showed no definitive ischemia at submaximal heart rate (85% MPHR vs 85% expected). She did have evidence of moderate LVH and mild mid cavitary obstruction with Valsalva.      Echocardiogram 1/13/2022 showed EF 75% with no turbulence seen through the LVOT at rest and minimal increase in velocity with valsalva. No significant dynamic outflow tract gradient was identified.     She followed up most recently with Dr. Cobos on 6/16/2022 at which time she noted dyspnea with certain activities and left arm pain at night. No chest pain or shortness of breath. ECG showed NSR, rate 65 bpm. BP was well controlled.     Echocardiogram 3/14/2023 shows EF 65% with wall thickness upper limits of normal and no turbulence seen through the LVOT at rest or with Valsalva.     She followed up most recently on 12/29/2023 at which time she was doing very well from a cardiac standpoint. No changes were made to her regimen.    7/2/2024: Margaret presents for routine follow up. She continues to do well from a cardiac standpoint. BP has been running in the 120/60's at home. She denies any chest pain. Notes dyspnea only when carrying laundry up 2 flights of steps. No edema other than when she injured her left ankle several months ago. No lightheadedness. She will occasionally hear her heartbeat in her left " ear. States BP is typically acceptable during those times. She completed blood work for her PCP on 6/26/24 which showed LDL 78. Ha1c 7.2.    Medical Problems       Problem List       Hypertension    Moderate concentric left ventricular hypertrophy    Left ventricular outflow obstruction    Abnormal stress echocardiogram    Coronary artery calcification seen on CT scan    Mixed hyperlipidemia    Type 2 diabetes mellitus without complication, with long-term current use of insulin (HCC)    Hypothyroid        Past Medical History:   Diagnosis Date    Arthritis     Basal cell carcinoma     Diabetes (HCC)     Hypercholesteremia     Hypertension     Hypothyroid     Macular degeneration     Melanoma (HCC)     Rosacea     Vertigo      Social History     Socioeconomic History    Marital status:      Spouse name: Not on file    Number of children: 2    Years of education: Not on file    Highest education level: Not on file   Occupational History    Not on file   Tobacco Use    Smoking status: Never    Smokeless tobacco: Never   Vaping Use    Vaping status: Never Used   Substance and Sexual Activity    Alcohol use: Never    Drug use: Never    Sexual activity: Not on file     Comment: Defer   Other Topics Concern    Not on file   Social History Narrative    Not on file     Social Determinants of Health     Financial Resource Strain: Not on file   Food Insecurity: No Food Insecurity (2/21/2023)    Received from [facility]    Hunger Vital Sign     Worried About Running Out of Food in the Last Year: Never true     Ran Out of Food in the Last Year: Never true   Transportation Needs: Not on file   Physical Activity: Not on file   Stress: Not on file   Social Connections: Unknown (6/18/2024)    Received from [facility]    Social Connections     How often do you feel lonely or isolated from those around you? (Adult - for ages 18 years and over): Not on file   Intimate Partner Violence: Not on file   Housing Stability:  Not on file      Family History   Problem Relation Age of Onset    Diabetes Mother     Hyperlipidemia Mother     Hypertension Mother     Heart failure Mother     Heart attack Father         Unsure    Hypertension Father     Hypertension Brother     Diabetes Brother     Drug abuse Brother      Past Surgical History:   Procedure Laterality Date    CHOLECYSTECTOMY      HYSTERECTOMY      INCONTINENCE SURGERY      MOHS SURGERY      on face, [facility]    SKIN CANCER EXCISION      melanoma right arm. basal cell and squamous cell.    TONSILLECTOMY         Current Outpatient Medications:     amLODIPine (NORVASC) 5 mg tablet, take 1 tablet by mouth once daily, Disp: 90 tablet, Rfl: 3    BD Pen Needle Anna 2nd Gen 32G X 4 MM MISC, use with INSULIN PENS 5 PER DAY, Disp: , Rfl:     fluticasone (FLONASE) 50 mcg/act nasal spray, 1 spray into each nostril daily, Disp: , Rfl:     glucose blood (OneTouch Verio) test strip, Use up to 4 times a day  E11.9, Disp: , Rfl:     insulin aspart (NovoLOG FlexPen) 100 UNIT/ML injection pen, Inject 10 Units under the skin 3 (three) times a day with meals, Disp: , Rfl:     Lantus SoloStar 100 units/mL injection pen, Inject 45 Units under the skin daily In am, Disp: , Rfl:     levothyroxine 75 mcg tablet, Take by mouth, Disp: , Rfl:     losartan (COZAAR) 100 MG tablet, Take 1 tablet (100 mg total) by mouth daily, Disp: 90 tablet, Rfl: 3    Multiple Vitamins-Minerals (OCUVITE ADULT 50+ PO), Take by mouth For macular degeneration, Disp: , Rfl:     rosuvastatin (CRESTOR) 40 MG tablet, Take 1 tablet (40 mg total) by mouth daily, Disp: 30 tablet, Rfl: 11  Allergies   Allergen Reactions    Lisinopril Cough    Naproxen Itching    Salicylates Itching       Labs:     Chemistry        Component Value Date/Time    K 4.4 03/14/2024 1047     03/14/2024 1047    CO2 27 03/14/2024 1047    BUN 19 03/14/2024 1047    BUN 15 11/20/2019 0803    CREATININE 0.7 03/14/2024 1047        Component Value  Date/Time    CALCIUM 9.5 03/14/2024 1047    ALKPHOS 94 03/14/2024 1047    AST 21 03/14/2024 1047    ALT 30 03/14/2024 1047        Lipid panel 8/9/2023: C 145. T 114. H 42. L 80.  Lipid panel 6/26/2024: C 144. T 107. H 45. L 78.     Cardiac testing:  ECG 12/29/2023: Normal sinus rhythm. Rate 63 bpm.     Lipid panel 9/6/2022: C 141. T 150. H 41. L 70.     Echocardiogram 3/14/2023:  EF 65%. Normal LV wall thickness with no turbulence seen through the LVOT at rest or with Valsalva. Grade 1 DD.  LA mildly dilated.  Trace TR. Trace NH.     ECG 6/16/2022: Normal sinus rhythm. Normal ECG.     Echocardiogram 1/13/2022:   EF 75%.  Minimal increase in velocity through the LVOT with Valsalva.   No dynamic outflow tract gradient.   Mild TR.   Trace MR.      Lipid panel 7/6/2021: C 135. T 164. H 40. L 67.     Echocardiogram 12/1/2020:   EF 70-75%.  Mildly increased wall thickness.   Dynamic obstruction during Valsalva in the mid cavity with a mean gradient of 4 mmHg.   Mild diastolic dysfunction.   Mildly dilated left atrium.   Trace MR.   Trace TR.      Lipid panel 12/24/2020: C 149. T 87. H 47. L 85.     Stress echocardiogram 09/22/2020:  Diego. 6:00. 6 METs. 84% MPHR. PVC's at rest.   Test terminated due to hypertensive BP response to exercise with peak /92.   Echocardiogram notable for EF >70% (hyperdynamic) with moderately increased wall thickness and mild mid cavitary obstruction with Valsalva.   IVSd 1.4 cm. LVPWd 1.5 cm.   Mild left atrial enlargement was also noted.   Mild diastolic dysfunction.   Aortic sclerosis without stenosis.      CT chest 09/04/2020:  No acute cardiopulmonary process.  There is severe coronary atherosclerotic disease.  No focal abnormality involving the chest wall was identified (preliminary report).     Lipid panel 9/3/2019: C 146. T 112. H 40. L 84.      EKG 07/31/2019:  Normal sinus rhythm.  Normal EKG.  Leftward axis.    Review of Systems   Constitutional: Negative.   HENT:  Positive for  "tinnitus (left ear, heartbeat, occasional).    Cardiovascular:  Positive for dyspnea on exertion. Negative for chest pain, irregular heartbeat, leg swelling, near-syncope, orthopnea and palpitations.   Respiratory:  Negative for cough and snoring.    Endocrine: Negative.    Skin: Negative.    Musculoskeletal: Negative.    Gastrointestinal: Negative.    Genitourinary: Negative.    Neurological: Negative.    Psychiatric/Behavioral: Negative.         Vitals:    07/02/24 1411   BP: 140/68   Pulse:    SpO2:      Vitals:    07/02/24 1339   Weight: 85.3 kg (188 lb)     Height: 5' 1\" (154.9 cm)   Body mass index is 35.52 kg/m².    Physical Exam  Vitals and nursing note reviewed.   Constitutional:       General: She is not in acute distress.     Appearance: She is well-developed. She is obese. She is not diaphoretic.   HENT:      Head: Normocephalic and atraumatic.   Neck:      Thyroid: No thyromegaly.      Vascular: Carotid bruit present. No JVD.   Cardiovascular:      Rate and Rhythm: Normal rate and regular rhythm.      Pulses: Intact distal pulses.           Radial pulses are 2+ on the right side and 2+ on the left side.      Heart sounds: S1 normal and S2 normal. Murmur heard.      Harsh midsystolic murmur is present at the upper right sternal border radiating to the neck.      Comments: No edema  Pulmonary:      Effort: Pulmonary effort is normal.      Breath sounds: Normal breath sounds.   Abdominal:      General: There is no distension.      Palpations: Abdomen is soft.      Tenderness: There is no abdominal tenderness.   Musculoskeletal:         General: Normal range of motion.      Cervical back: Normal range of motion and neck supple.   Lymphadenopathy:      Cervical: No cervical adenopathy.   Skin:     General: Skin is warm and dry.   Neurological:      Mental Status: She is alert and oriented to person, place, and time.      Cranial Nerves: No cranial nerve deficit.   Psychiatric:         Mood and Affect: Mood " and affect normal.         Behavior: Behavior normal.

## 2024-07-02 NOTE — PATIENT INSTRUCTIONS
Update echo and get a carotid ultrasound. Based on results of the carotid it is reasonable to see an ear, nose, throat specialist about the heartbeat in the ear.  Continue to monitor BP at home. It is at goal at home.  Cholesterol remains just above goal. We will trial 40 mg of rosuvastatin. Contact me in a few weeks and if you tolerate this I will order a 90 day supply. You can take 2 of your 20 mg pills together to use up.

## 2024-07-02 NOTE — ASSESSMENT & PLAN NOTE
Lipid panel 8/9/2023: C 145. T 114. H 42. L 80.  Lipid panel 6/26/2024: C 144. T 107. H 45. L 78.  She is tolerating rosuvastatin 20 mg daily  Goal LDL < 70.  Trial titration of rosuvastatin to 40 mg daily.  Repeat lipid panel in 8-12 weeks.

## 2024-07-02 NOTE — ASSESSMENT & PLAN NOTE
Harsh systolic murmur heard on exam today.  Known aortic valve sclerosis.  Will obtain updated echo to re-evaluate the aortic valve.

## 2024-07-06 DIAGNOSIS — E78.2 MIXED HYPERLIPIDEMIA: ICD-10-CM

## 2024-07-07 RX ORDER — ROSUVASTATIN CALCIUM 20 MG/1
20 TABLET, COATED ORAL DAILY
Qty: 90 TABLET | Refills: 3 | Status: SHIPPED | OUTPATIENT
Start: 2024-07-07

## 2024-08-14 ENCOUNTER — HOSPITAL ENCOUNTER (OUTPATIENT)
Dept: NON INVASIVE DIAGNOSTICS | Facility: HOSPITAL | Age: 69
Discharge: HOME | End: 2024-08-14
Payer: MEDICARE

## 2024-08-14 VITALS
DIASTOLIC BLOOD PRESSURE: 70 MMHG | WEIGHT: 188 LBS | HEIGHT: 61 IN | HEART RATE: 70 BPM | SYSTOLIC BLOOD PRESSURE: 140 MMHG | BODY MASS INDEX: 35.5 KG/M2

## 2024-08-14 DIAGNOSIS — I51.7 MODERATE CONCENTRIC LEFT VENTRICULAR HYPERTROPHY: ICD-10-CM

## 2024-08-14 DIAGNOSIS — R09.89 BRUIT: ICD-10-CM

## 2024-08-14 DIAGNOSIS — Q24.8 LEFT VENTRICULAR OUTFLOW OBSTRUCTION: ICD-10-CM

## 2024-08-14 LAB
AORTIC ROOT: 3 CM
APICAL FOUR CHAMBER EJECTION FRACTION: 76 %
AV PEAK GRADIENT: 14 MMHG
BSA FOR ECHO PROCEDURE: 1.84 M2
E WAVE DECELERATION TIME: 317 MS
E/A RATIO: 0.74
FRACTIONAL SHORTENING: 41 (ref 28–44)
INTERVENTRICULAR SEPTUM IN DIASTOLE (PARASTERNAL SHORT AXIS VIEW): 1.1 CM
INTERVENTRICULAR SEPTUM: 1.1 CM (ref 0.6–1.1)
LAAS-AP2: 13.7 CM2
LAAS-AP4: 12.6 CM2
LEFT ATRIUM SIZE: 3.9 CM
LEFT ATRIUM VOLUME (MOD BIPLANE): 28 ML
LEFT ATRIUM VOLUME INDEX (MOD BIPLANE): 15.2 ML/M2
LEFT INTERNAL DIMENSION IN SYSTOLE: 2.3 CM (ref 2.1–4)
LEFT VENTRICLE DIASTOLIC VOLUME (MOD BIPLANE): 50 ML
LEFT VENTRICLE DIASTOLIC VOLUME INDEX (MOD BIPLANE): 27.2 ML/M2
LEFT VENTRICLE SYSTOLIC VOLUME (MOD BIPLANE): 13 ML
LEFT VENTRICLE SYSTOLIC VOLUME INDEX (MOD BIPLANE): 7.1 ML/M2
LEFT VENTRICULAR INTERNAL DIMENSION IN DIASTOLE: 3.9 CM (ref 3.5–6)
LEFT VENTRICULAR POSTERIOR WALL IN END DIASTOLE: 1.2 CM
LEFT VENTRICULAR STROKE VOLUME: 46 ML
LV EF: 75 %
LVSV (TEICH): 46 ML
MV E'TISSUE VEL-LAT: 8 CM/S
MV E'TISSUE VEL-SEP: 7 CM/S
MV PEAK A VEL: 1.17 M/S
MV PEAK E VEL: 87 CM/S
MV STENOSIS PRESSURE HALF TIME: 92 MS
MV VALVE AREA P 1/2 METHOD: 2.39
RIGHT ATRIUM AREA SYSTOLE A4C: 13.9 CM2
RIGHT VENTRICLE ID DIMENSION: 3.6 CM
SL CV LEFT ATRIUM LENGTH A2C: 5.1 CM
SL CV PED ECHO LEFT VENTRICLE DIASTOLIC VOLUME (MOD BIPLANE) 2D: 65 ML
SL CV PED ECHO LEFT VENTRICLE SYSTOLIC VOLUME (MOD BIPLANE) 2D: 19 ML
TRICUSPID ANNULAR PLANE SYSTOLIC EXCURSION: 2.5 CM

## 2024-08-14 PROCEDURE — 93880 EXTRACRANIAL BILAT STUDY: CPT

## 2024-08-14 PROCEDURE — 93306 TTE W/DOPPLER COMPLETE: CPT

## 2024-08-14 PROCEDURE — 93880 EXTRACRANIAL BILAT STUDY: CPT | Performed by: SURGERY

## 2024-08-14 PROCEDURE — 93306 TTE W/DOPPLER COMPLETE: CPT | Performed by: INTERNAL MEDICINE

## 2024-09-29 DIAGNOSIS — E78.2 MIXED HYPERLIPIDEMIA: ICD-10-CM

## 2024-09-30 RX ORDER — ROSUVASTATIN CALCIUM 40 MG/1
40 TABLET, COATED ORAL DAILY
Qty: 90 TABLET | Refills: 1 | Status: SHIPPED | OUTPATIENT
Start: 2024-09-30

## 2024-10-03 DIAGNOSIS — I51.7 MODERATE CONCENTRIC LEFT VENTRICULAR HYPERTROPHY: ICD-10-CM

## 2024-10-03 DIAGNOSIS — I10 PRIMARY HYPERTENSION: ICD-10-CM

## 2024-10-03 RX ORDER — AMLODIPINE BESYLATE 5 MG/1
TABLET ORAL
Qty: 90 TABLET | Refills: 1 | Status: SHIPPED | OUTPATIENT
Start: 2024-10-03

## 2024-12-27 NOTE — PROGRESS NOTES
Cardiology Follow Up    Carli Hernández  1955  [id number]  [facility] [place] [address]  [address]  [facility] [address] [phone]    Carli presents today for routine follow up of HTN, HLD, and abnormal stress echo with LVH and left ventricular outflow obstruction.     1. Primary hypertension  Assessment & Plan:  Blood pressure is at goal.   Goal BP < 130/80.  Continue amlodipine 5 mg daily and losartan 100 mg daily.  Encouraged 2 g sodium diet, weight control, regular exercise.  Will be completing labs for PCP this month.  Orders:  -     losartan (COZAAR) 100 MG tablet; Take 1 tablet (100 mg total) by mouth daily  2. Moderate concentric left ventricular hypertrophy  Assessment & Plan:  Moderate concentric left ventricular hypertrophy noted on echocardiogram pre stress echocardiogram 09/22/2020.  IVSd 1.4 cm. LVPWd 1.5 cm.   Repeat echocardiogram on 12/1/2020 showed only mild cLVH with IVSd 1.1 and LVPWd 1.1.  Mod LVH on 8/2024 study.  Will monitor again in 2 years time or sooner if clinically indicated.  3. Left ventricular outflow obstruction  Assessment & Plan:  Stress echocardiogram 9/22/2020 suggested mild mid cavitary obstruction with Valsalva.  No definitive LVOT obstruction was noted.  Please see full discussion under abnormal stress echocardiogram and moderate concentric left ventricular hypertrophy.  Similar mild mid cavitary obstruction with Valsalva noted on echo 12/1/2020.  Repeat study 1/13/2022 showed no obstruction.   Repeat echo August 2024 again showed no evidence of LVOT obstruction.  Currently without symptoms of concern.  Will monitor.  4. Coronary artery calcification seen on CT scan  Assessment & Plan:  CT scan 9/04/2020 suggested “severe coronary atherosclerotic disease”.  Non ischemic stress echocardiogram at 84% MPHR.    Recommend aggressive risk factor modification.   LDL goal < 70.   BP ideally < 130/80.  Would have a  low threshold for coronary CT angiogram vs cardiac catheterization if she has progressive shortness of breath/dyspnea on exertion or chest pain concerning for underlying coronary artery disease.  Would add aspirin however she has an aspirin allergy (itching).   At this time she adamantly denies anginal complaints.  5. Abnormal stress echocardiogram  Assessment & Plan:  Patient initially presented to her PCP in 2020 with complaints of right-sided chest discomfort.  CT scan of the chest was ordered which showed “severe coronary atherosclerotic disease”.  Patient had stress echocardiogram 9/22/2020 and showed no definitive evidence of ischemia at submaximal heart rate (84% MPHR vs 85% expected).  Stress echocardiogram was notable for moderate concentric left ventricular hypertrophy as well as mild mid cavitary obstruction with Valsalva.  Echocardiogram 12/1/2020 showed mild cLVH and a mid cavitary MG of 4 mmHg.   Patients blood pressure was not well controlled and likely LVH was due to this however may consider cardiac MRI to assess for hypertrophic cardiomyopathy.   Echocardiogram 1/13/2022 showed no mid cavitary gradient or LVOT gradient.   See further discussion below.   6. Mixed hyperlipidemia  Assessment & Plan:  Lipid panel 8/9/2023: C 145. T 114. H 42. L 80.  Lipid panel 6/26/2024: C 144. T 107. H 45. L 78.  She is tolerating rosuvastatin 20 mg daily  Goal LDL < 70.  Rosuvastatin increased to 40 mg daily.  Repeat lipid panel now - order provided again.    7. Aortic valve sclerosis  Assessment & Plan:  Known aortic valve sclerosis.  No evidence of stenosis on 8/2024 echo.  Will monitor again in 2-3 years or sooner if clinically indicated.     ELISSA Robles has a past medical history of HTN, HLD, T2DM, hypothyroid, melanoma, gallbladder disease, abnormal stress echo, LVH, and coronary artery calcification seen on CT.     She initially was referred to Dr. Cobos for evaluation of abnormal stress echocardiogram. She  "was undergoing work up for right sided abdominal/chest pain. CT of her chest revealed \"severe coronary atherosclerotic disease\". A stress echo was ordered which showed no definitive ischemia at submaximal heart rate (85% MPHR vs 85% expected). She did have evidence of moderate LVH and mild mid cavitary obstruction with Valsalva.      Echocardiogram 1/13/2022 showed EF 75% with no turbulence seen through the LVOT at rest and minimal increase in velocity with valsalva. No significant dynamic outflow tract gradient was identified.     She followed up most recently with Dr. Cobos on 6/16/2022 at which time she noted dyspnea with certain activities and left arm pain at night. No chest pain or shortness of breath. ECG showed NSR, rate 65 bpm. BP was well controlled.     Echocardiogram 3/14/2023 shows EF 65% with wall thickness upper limits of normal and no turbulence seen through the LVOT at rest or with Valsalva.      1/2/2025: Margaret presents for routine follow up. We reviewed her echocardiogram completed 8/14/2024 which shows no change from 2023. She reports excellent BP control at home and OV's in 120/60's range. She feels very well. No chest pain, shortness of breath, lightheadedness, palpitations, or edema. She is due for updated lipid testing since increasing her statin.     Medical Problems       Problem List       Hypertension    Moderate concentric left ventricular hypertrophy    Left ventricular outflow obstruction    Abnormal stress echocardiogram    Coronary artery calcification seen on CT scan    Mixed hyperlipidemia    Type 2 diabetes mellitus without complication, with long-term current use of insulin (HCC)    Hypothyroid    Aortic valve sclerosis    Bruit        Past Medical History:   Diagnosis Date    Arthritis     Basal cell carcinoma     Diabetes (HCC)     Hypercholesteremia     Hypertension     Hypothyroid     Macular degeneration     Melanoma (HCC)     Rosacea     Vertigo      Social History "     Socioeconomic History    Marital status:      Spouse name: Not on file    Number of children: 2    Years of education: Not on file    Highest education level: Not on file   Occupational History    Not on file   Tobacco Use    Smoking status: Never    Smokeless tobacco: Never   Vaping Use    Vaping status: Never Used   Substance and Sexual Activity    Alcohol use: Never    Drug use: Never    Sexual activity: Not on file     Comment: Defer   Other Topics Concern    Not on file   Social History Narrative    Not on file     Social Drivers of Health     Financial Resource Strain: Low Risk  (8/3/2024)    Received from [facility]    Financial Resource Strain     Do you have any trouble paying for your medications, or do you think you might in the future?: No     Does your family have trouble paying for medicine? (Household - for ages 0-17 years): Not on file   Food Insecurity: No Food Insecurity (8/3/2024)    Received from [facility]    Hunger Vital Sign     Worried About Running Out of Food in the Last Year: Never true     Ran Out of Food in the Last Year: Never true   Transportation Needs: No Transportation Needs (8/3/2024)    Received from [facility]    Transportation Needs     Do you have trouble getting a ride to medical visits or work? (Adult - for ages 18 years and over): Not on file     Does your family have a hard time getting a ride to doctors’ visits? (Household - for ages 0-17 years): Not on file     Has lack of transportation kept you from medical appointments, meetings, work, or from getting things needed for daily living? Check all that apply.: No     Do you (or your family) have trouble finding or paying for a ride (transportation)? (Household - for ages 0-17 years): Not on file   Physical Activity: Not on file   Stress: Not on file   Social Connections: Socially Integrated (8/3/2024)    Received from [facility]    Social Connections     How often do you feel lonely or isolated from those around  you?: Rarely   Intimate Partner Violence: Not on file   Housing Stability: Low Risk  (8/3/2024)    Received from [employer]     Do you currently live in a shelter or have no steady place to sleep at night?: No     Do you think you are at risk of becoming homeless? (Adult - for ages 18 years and over): Not on file     Does your family worry about paying for your home or becoming homeless? (Household - for ages 0-17 years): Not on file     Are you homeless or worried that you might be in the future?: No     Are you (or your family) homeless or worried that you might be in the future? (Household - for ages 0-17 years): Not on file      Family History   Problem Relation Age of Onset    Diabetes Mother     Hyperlipidemia Mother     Hypertension Mother     Heart failure Mother     Heart attack Father         Unsure    Hypertension Father     Hypertension Brother     Diabetes Brother     Drug abuse Brother      Past Surgical History:   Procedure Laterality Date    CHOLECYSTECTOMY      HYSTERECTOMY      INCONTINENCE SURGERY      MOHS SURGERY      on face, [facility]    SKIN CANCER EXCISION      melanoma right arm. basal cell and squamous cell.    TONSILLECTOMY         Current Outpatient Medications:     amLODIPine (NORVASC) 5 mg tablet, take 1 tablet by mouth once daily, Disp: 90 tablet, Rfl: 1    BD Pen Needle Anna 2nd Gen 32G X 4 MM MISC, use with INSULIN PENS 5 PER DAY, Disp: , Rfl:     fluticasone (FLONASE) 50 mcg/act nasal spray, 1 spray into each nostril daily, Disp: , Rfl:     glucose blood (OneTouch Verio) test strip, Use up to 4 times a day  E11.9, Disp: , Rfl:     insulin aspart (NovoLOG FlexPen) 100 UNIT/ML injection pen, Inject 10 Units under the skin 3 (three) times a day with meals, Disp: , Rfl:     Lantus SoloStar 100 units/mL injection pen, Inject 45 Units under the skin daily In am, Disp: , Rfl:     levothyroxine 75 mcg tablet, Take by mouth, Disp: , Rfl:     losartan (COZAAR)  100 MG tablet, Take 1 tablet (100 mg total) by mouth daily, Disp: 90 tablet, Rfl: 3    Multiple Vitamins-Minerals (OCUVITE ADULT 50+ PO), Take by mouth For macular degeneration, Disp: , Rfl:     rosuvastatin (CRESTOR) 40 MG tablet, take 1 tablet by mouth once daily, Disp: 90 tablet, Rfl: 1  Allergies   Allergen Reactions    Lisinopril Cough    Naproxen Itching    Salicylates Itching       Labs:     Chemistry        Component Value Date/Time    K 4.4 03/14/2024 1047     03/14/2024 1047    CO2 27 03/14/2024 1047    BUN 19 03/14/2024 1047    BUN 15 11/20/2019 0803    CREATININE 0.7 03/14/2024 1047        Component Value Date/Time    CALCIUM 9.5 03/14/2024 1047    ALKPHOS 94 03/14/2024 1047    AST 21 03/14/2024 1047    ALT 30 03/14/2024 1047        Lipid panel 8/9/2023: C 145. T 114. H 42. L 80.  Lipid panel 6/26/2024: C 144. T 107. H 45. L 78.     Cardiac testing:  Echo 8/14/2024:    Left Ventricle: Left ventricle is normal in size with moderate concentric left ventricle hypertrophy and excellent systolic function.  Estimated LVEF is 75%.  No regional wall motion abnormality noted.  No resting LVOT gradient.  Valsalva maneuver led to slightly increased gradient due to basal septal hypertrophy. Diastolic function is mildly abnormal, consistent with grade I (abnormal) relaxation.    Aortic Valve: Aortic valve is trileaflet with mild sclerosis.  No aortic stenosis.    ECG 12/29/2023: Normal sinus rhythm. Rate 63 bpm.     Lipid panel 9/6/2022: C 141. T 150. H 41. L 70.     Echocardiogram 3/14/2023:  EF 65%. Normal LV wall thickness with no turbulence seen through the LVOT at rest or with Valsalva. Grade 1 DD.  LA mildly dilated.  Trace TR. Trace MN.     ECG 6/16/2022: Normal sinus rhythm. Normal ECG.     Echocardiogram 1/13/2022:   EF 75%.  Minimal increase in velocity through the LVOT with Valsalva.   No dynamic outflow tract gradient.   Mild TR.   Trace MR.      Lipid panel 7/6/2021: C 135. T 164. H 40. L 67.    "  Echocardiogram 12/1/2020:   EF 70-75%.  Mildly increased wall thickness.   Dynamic obstruction during Valsalva in the mid cavity with a mean gradient of 4 mmHg.   Mild diastolic dysfunction.   Mildly dilated left atrium.   Trace MR.   Trace TR.      Lipid panel 12/24/2020: C 149. T 87. H 47. L 85.     Stress echocardiogram 09/22/2020:  Diego. 6:00. 6 METs. 84% MPHR. PVC's at rest.   Test terminated due to hypertensive BP response to exercise with peak /92.   Echocardiogram notable for EF >70% (hyperdynamic) with moderately increased wall thickness and mild mid cavitary obstruction with Valsalva.   IVSd 1.4 cm. LVPWd 1.5 cm.   Mild left atrial enlargement was also noted.   Mild diastolic dysfunction.   Aortic sclerosis without stenosis.      CT chest 09/04/2020:  No acute cardiopulmonary process.  There is severe coronary atherosclerotic disease.  No focal abnormality involving the chest wall was identified (preliminary report).     Lipid panel 9/3/2019: C 146. T 112. H 40. L 84.      EKG 07/31/2019:  Normal sinus rhythm.  Normal EKG.  Leftward axis.    Review of Systems   Constitutional: Negative.   HENT: Negative.     Cardiovascular:  Negative for chest pain, dyspnea on exertion, irregular heartbeat, leg swelling, near-syncope, orthopnea and palpitations.   Respiratory:  Negative for cough and snoring.    Endocrine: Negative.    Skin: Negative.    Musculoskeletal: Negative.    Gastrointestinal: Negative.    Genitourinary: Negative.    Neurological: Negative.    Psychiatric/Behavioral: Negative.         Vitals:    01/02/25 1451   BP: 128/72   Pulse: 72   Temp: 98.1 °F (36.7 °C)   SpO2: 99%     Vitals:    01/02/25 1451   Weight: 87.3 kg (192 lb 6.4 oz)     Height: 5' 1\" (154.9 cm)   Body mass index is 36.35 kg/m².    Physical Exam  Vitals and nursing note reviewed.   Constitutional:       General: She is not in acute distress.     Appearance: She is well-developed. She is not diaphoretic.   HENT:      Head: " Normocephalic and atraumatic.   Neck:      Thyroid: No thyromegaly.      Vascular: No carotid bruit or JVD.   Cardiovascular:      Rate and Rhythm: Normal rate and regular rhythm.      Pulses: Intact distal pulses.           Radial pulses are 2+ on the right side and 2+ on the left side.      Heart sounds: S1 normal and S2 normal. Murmur heard.      Systolic murmur is present.      Comments: Minimal edema in left lower leg. None in right.  Pulmonary:      Effort: Pulmonary effort is normal.      Breath sounds: Normal breath sounds.   Abdominal:      General: There is no distension.      Palpations: Abdomen is soft.      Tenderness: There is no abdominal tenderness.   Musculoskeletal:         General: Normal range of motion.      Cervical back: Normal range of motion and neck supple.   Lymphadenopathy:      Cervical: No cervical adenopathy.   Skin:     General: Skin is warm and dry.   Neurological:      Mental Status: She is alert and oriented to person, place, and time.      Cranial Nerves: No cranial nerve deficit.   Psychiatric:         Behavior: Behavior normal.

## 2025-01-02 ENCOUNTER — OFFICE VISIT (OUTPATIENT)
Dept: CARDIOLOGY CLINIC | Facility: CLINIC | Age: 70
End: 2025-01-02
Payer: MEDICARE

## 2025-01-02 VITALS
SYSTOLIC BLOOD PRESSURE: 128 MMHG | DIASTOLIC BLOOD PRESSURE: 72 MMHG | OXYGEN SATURATION: 99 % | BODY MASS INDEX: 36.32 KG/M2 | HEART RATE: 72 BPM | WEIGHT: 192.4 LBS | HEIGHT: 61 IN | TEMPERATURE: 98.1 F

## 2025-01-02 DIAGNOSIS — R94.39 ABNORMAL STRESS ECHOCARDIOGRAM: ICD-10-CM

## 2025-01-02 DIAGNOSIS — I10 PRIMARY HYPERTENSION: Primary | ICD-10-CM

## 2025-01-02 DIAGNOSIS — Q24.8 LEFT VENTRICULAR OUTFLOW OBSTRUCTION: ICD-10-CM

## 2025-01-02 DIAGNOSIS — E78.2 MIXED HYPERLIPIDEMIA: ICD-10-CM

## 2025-01-02 DIAGNOSIS — I25.10 CORONARY ARTERY CALCIFICATION SEEN ON CT SCAN: ICD-10-CM

## 2025-01-02 DIAGNOSIS — I51.7 MODERATE CONCENTRIC LEFT VENTRICULAR HYPERTROPHY: ICD-10-CM

## 2025-01-02 DIAGNOSIS — I35.8 AORTIC VALVE SCLEROSIS: ICD-10-CM

## 2025-01-02 PROCEDURE — 99214 OFFICE O/P EST MOD 30 MIN: CPT | Performed by: NURSE PRACTITIONER

## 2025-01-02 RX ORDER — LOSARTAN POTASSIUM 100 MG/1
100 TABLET ORAL DAILY
Qty: 90 TABLET | Refills: 3 | Status: SHIPPED | OUTPATIENT
Start: 2025-01-02

## 2025-01-02 NOTE — ASSESSMENT & PLAN NOTE
Lipid panel 8/9/2023: C 145. T 114. H 42. L 80.  Lipid panel 6/26/2024: C 144. T 107. H 45. L 78.  She is tolerating rosuvastatin 20 mg daily  Goal LDL < 70.  Rosuvastatin increased to 40 mg daily.  Repeat lipid panel now - order provided again.

## 2025-01-02 NOTE — ASSESSMENT & PLAN NOTE
Known aortic valve sclerosis.  No evidence of stenosis on 8/2024 echo.  Will monitor again in 2-3 years or sooner if clinically indicated.

## 2025-01-02 NOTE — ASSESSMENT & PLAN NOTE
Stress echocardiogram 9/22/2020 suggested mild mid cavitary obstruction with Valsalva.  No definitive LVOT obstruction was noted.  Please see full discussion under abnormal stress echocardiogram and moderate concentric left ventricular hypertrophy.  Similar mild mid cavitary obstruction with Valsalva noted on echo 12/1/2020.  Repeat study 1/13/2022 showed no obstruction.   Repeat echo August 2024 again showed no evidence of LVOT obstruction.  Currently without symptoms of concern.  Will monitor.

## 2025-01-02 NOTE — ASSESSMENT & PLAN NOTE
Moderate concentric left ventricular hypertrophy noted on echocardiogram pre stress echocardiogram 09/22/2020.  IVSd 1.4 cm. LVPWd 1.5 cm.   Repeat echocardiogram on 12/1/2020 showed only mild cLVH with IVSd 1.1 and LVPWd 1.1.  Mod LVH on 8/2024 study.  Will monitor again in 2 years time or sooner if clinically indicated.

## 2025-01-02 NOTE — PATIENT INSTRUCTIONS
Blood pressure is perfect.  Echo shows calcium on the aortic valve but this is not affecting valve function.  We will recheck an echo in 2-3 years.  Repeat cholesterol with upcoming blood work.

## 2025-01-02 NOTE — ASSESSMENT & PLAN NOTE
Blood pressure is at goal.   Goal BP < 130/80.  Continue amlodipine 5 mg daily and losartan 100 mg daily.  Encouraged 2 g sodium diet, weight control, regular exercise.  Will be completing labs for PCP this month.

## 2025-04-05 DIAGNOSIS — I51.7 MODERATE CONCENTRIC LEFT VENTRICULAR HYPERTROPHY: ICD-10-CM

## 2025-04-05 DIAGNOSIS — I10 PRIMARY HYPERTENSION: ICD-10-CM

## 2025-04-07 RX ORDER — AMLODIPINE BESYLATE 5 MG/1
5 TABLET ORAL DAILY
Qty: 90 TABLET | Refills: 1 | Status: SHIPPED | OUTPATIENT
Start: 2025-04-07

## 2025-07-11 DIAGNOSIS — I51.7 MODERATE CONCENTRIC LEFT VENTRICULAR HYPERTROPHY: ICD-10-CM

## 2025-07-11 DIAGNOSIS — I10 PRIMARY HYPERTENSION: ICD-10-CM

## 2025-07-11 RX ORDER — LOSARTAN POTASSIUM 100 MG/1
100 TABLET ORAL DAILY
Qty: 90 TABLET | Refills: 3 | Status: SHIPPED | OUTPATIENT
Start: 2025-07-11

## 2025-07-11 RX ORDER — AMLODIPINE BESYLATE 5 MG/1
5 TABLET ORAL DAILY
Qty: 90 TABLET | Refills: 3 | Status: SHIPPED | OUTPATIENT
Start: 2025-07-11

## 2025-08-20 RX ORDER — INSULIN LISPRO 100 [IU]/ML
INJECTION, SOLUTION INTRAVENOUS; SUBCUTANEOUS
COMMUNITY
Start: 2025-06-12